# Patient Record
Sex: MALE | Race: WHITE | NOT HISPANIC OR LATINO | Employment: FULL TIME | URBAN - METROPOLITAN AREA
[De-identification: names, ages, dates, MRNs, and addresses within clinical notes are randomized per-mention and may not be internally consistent; named-entity substitution may affect disease eponyms.]

---

## 2017-05-09 ENCOUNTER — OFFICE VISIT - HEALTHEAST (OUTPATIENT)
Dept: FAMILY MEDICINE | Facility: CLINIC | Age: 37
End: 2017-05-09

## 2017-05-09 DIAGNOSIS — Z78.9 ENGAGES IN TRAVEL ABROAD: ICD-10-CM

## 2017-05-09 DIAGNOSIS — Z00.00 HEALTH CARE MAINTENANCE: ICD-10-CM

## 2017-05-09 LAB
CHOLEST SERPL-MCNC: 202 MG/DL
FASTING STATUS PATIENT QL REPORTED: YES
HDLC SERPL-MCNC: 47 MG/DL
LDLC SERPL CALC-MCNC: 135 MG/DL
TRIGL SERPL-MCNC: 98 MG/DL

## 2017-05-09 ASSESSMENT — MIFFLIN-ST. JEOR: SCORE: 1720.85

## 2017-05-16 ENCOUNTER — COMMUNICATION - HEALTHEAST (OUTPATIENT)
Dept: FAMILY MEDICINE | Facility: CLINIC | Age: 37
End: 2017-05-16

## 2018-08-03 ENCOUNTER — OFFICE VISIT - HEALTHEAST (OUTPATIENT)
Dept: FAMILY MEDICINE | Facility: CLINIC | Age: 38
End: 2018-08-03

## 2018-08-03 DIAGNOSIS — D22.9 NUMEROUS MOLES: ICD-10-CM

## 2018-08-03 DIAGNOSIS — Z00.00 HEALTH CARE MAINTENANCE: ICD-10-CM

## 2018-08-03 DIAGNOSIS — Z80.0 FAMILY HISTORY OF COLON CANCER: ICD-10-CM

## 2018-08-03 LAB
ALBUMIN SERPL-MCNC: 4.5 G/DL (ref 3.5–5)
ALP SERPL-CCNC: 44 U/L (ref 45–120)
ALT SERPL W P-5'-P-CCNC: 22 U/L (ref 0–45)
ANION GAP SERPL CALCULATED.3IONS-SCNC: 8 MMOL/L (ref 5–18)
AST SERPL W P-5'-P-CCNC: 24 U/L (ref 0–40)
BILIRUB SERPL-MCNC: 1.9 MG/DL (ref 0–1)
BUN SERPL-MCNC: 17 MG/DL (ref 8–22)
CALCIUM SERPL-MCNC: 9.8 MG/DL (ref 8.5–10.5)
CHLORIDE BLD-SCNC: 104 MMOL/L (ref 98–107)
CHOLEST SERPL-MCNC: 188 MG/DL
CO2 SERPL-SCNC: 27 MMOL/L (ref 22–31)
CREAT SERPL-MCNC: 0.93 MG/DL (ref 0.7–1.3)
FASTING STATUS PATIENT QL REPORTED: YES
GFR SERPL CREATININE-BSD FRML MDRD: >60 ML/MIN/1.73M2
GLUCOSE BLD-MCNC: 86 MG/DL (ref 70–125)
HDLC SERPL-MCNC: 46 MG/DL
LDLC SERPL CALC-MCNC: 127 MG/DL
POTASSIUM BLD-SCNC: 4.8 MMOL/L (ref 3.5–5)
PROT SERPL-MCNC: 7.2 G/DL (ref 6–8)
SODIUM SERPL-SCNC: 139 MMOL/L (ref 136–145)
TRIGL SERPL-MCNC: 77 MG/DL

## 2018-08-03 ASSESSMENT — MIFFLIN-ST. JEOR: SCORE: 1744.49

## 2018-08-06 ENCOUNTER — COMMUNICATION - HEALTHEAST (OUTPATIENT)
Dept: FAMILY MEDICINE | Facility: CLINIC | Age: 38
End: 2018-08-06

## 2018-08-06 LAB — 25(OH)D3 SERPL-MCNC: 30.7 NG/ML (ref 30–80)

## 2018-10-04 ENCOUNTER — RECORDS - HEALTHEAST (OUTPATIENT)
Dept: ADMINISTRATIVE | Facility: OTHER | Age: 38
End: 2018-10-04

## 2018-10-23 ENCOUNTER — RECORDS - HEALTHEAST (OUTPATIENT)
Dept: ADMINISTRATIVE | Facility: OTHER | Age: 38
End: 2018-10-23

## 2018-11-05 ENCOUNTER — RECORDS - HEALTHEAST (OUTPATIENT)
Dept: ADMINISTRATIVE | Facility: OTHER | Age: 38
End: 2018-11-05

## 2018-12-19 ENCOUNTER — OFFICE VISIT - HEALTHEAST (OUTPATIENT)
Dept: FAMILY MEDICINE | Facility: CLINIC | Age: 38
End: 2018-12-19

## 2018-12-19 DIAGNOSIS — J40 BRONCHITIS: ICD-10-CM

## 2019-01-17 ENCOUNTER — RECORDS - HEALTHEAST (OUTPATIENT)
Dept: ADMINISTRATIVE | Facility: OTHER | Age: 39
End: 2019-01-17

## 2019-01-23 ENCOUNTER — OFFICE VISIT - HEALTHEAST (OUTPATIENT)
Dept: FAMILY MEDICINE | Facility: CLINIC | Age: 39
End: 2019-01-23

## 2019-01-23 DIAGNOSIS — R05.3 PERSISTENT COUGH FOR 3 WEEKS OR LONGER: ICD-10-CM

## 2019-01-23 DIAGNOSIS — M54.50 ACUTE BILATERAL LOW BACK PAIN WITHOUT SCIATICA: ICD-10-CM

## 2019-01-23 RX ORDER — TIZANIDINE 2 MG/1
4 TABLET ORAL EVERY 6 HOURS PRN
Qty: 30 TABLET | Refills: 0 | Status: SHIPPED | OUTPATIENT
Start: 2019-01-23 | End: 2021-07-29

## 2019-01-25 ENCOUNTER — COMMUNICATION - HEALTHEAST (OUTPATIENT)
Dept: FAMILY MEDICINE | Facility: CLINIC | Age: 39
End: 2019-01-25

## 2020-07-06 ENCOUNTER — OFFICE VISIT - HEALTHEAST (OUTPATIENT)
Dept: FAMILY MEDICINE | Facility: CLINIC | Age: 40
End: 2020-07-06

## 2020-07-06 DIAGNOSIS — A69.20 EARLY LOCALIZED LYME DISEASE: ICD-10-CM

## 2021-05-30 VITALS — WEIGHT: 177.19 LBS | HEIGHT: 71 IN | BODY MASS INDEX: 24.81 KG/M2

## 2021-06-01 VITALS — HEIGHT: 71 IN | BODY MASS INDEX: 25.54 KG/M2 | WEIGHT: 182.4 LBS

## 2021-06-02 VITALS — WEIGHT: 179.38 LBS | BODY MASS INDEX: 25.02 KG/M2

## 2021-06-02 VITALS — WEIGHT: 181.4 LBS | BODY MASS INDEX: 25.3 KG/M2

## 2021-06-04 VITALS
TEMPERATURE: 98.6 F | DIASTOLIC BLOOD PRESSURE: 85 MMHG | OXYGEN SATURATION: 98 % | HEART RATE: 57 BPM | SYSTOLIC BLOOD PRESSURE: 136 MMHG | RESPIRATION RATE: 16 BRPM | BODY MASS INDEX: 25.17 KG/M2 | WEIGHT: 180.5 LBS

## 2021-06-09 NOTE — PROGRESS NOTES
Walk In Care Note                                                        Date of Visit: 7/6/2020     Chief Complaint   Titi Cabrera is a(n) 40 y.o. White or  male who presents to [facility] with the following complaint(s):  Rash (x2wks, on L chest, poss Lyme disease)       Assessment and Plan   1. Early localized Lyme disease  - doxycycline (MONODOX) 100 MG capsule; Take 1 capsule (100 mg total) by mouth 2 (two) times a day for 21 days.  Dispense: 42 capsule; Refill: 0      Patient presenting with rash consistent with early localized Lyme Disease. Empirically treating with doxycycline as listed above. Instructed patient to use adequate sun protection while taking this antibiotic. Discussed indications for reevaluation.     Counseled patient regarding assessment and plan for evaluation and treatment. Questions were answered. See AVS for the specific written instructions and educational handout(s) regarding Lyme Disease that were provided at the conclusion of the visit.     Discussed signs / symptoms that warrant urgent / emergent medical attention.     Follow up within 1 week if symptoms persist or worsen.      History of Present Illness   Primary symptom: Rash  Onset: 2 weeks ago  Location: Left pectoral area  Appearance: Small red patch initially, which is increasing in size and changing in appearance.   Progression: Gradually enlarging.   Pruritic: No  Painful: No  Discharge: No  Bleeding: No  Fevers: No  Chills: No  Associated sore throat: No  Preceding flu-like symptoms: None  Additional symptoms: None. Denies headaches, neck pain / stiffness, joint aches / swelling, and other rashes.   Home therapies utilized: None  History of similar rash: Yes, approximately 12 years ago, which was treated as Lyme Disease.   Contacts with similar rash: No  Known environmental exposure: No  New medication use: No  New detergent / fabric softener exposure: No  New personal hygiene product exposure: No  Pet / animal  exposure: No  Known tick bite: Has found deer ticks on himself in recent weeks but none of these were attached. Spends a significant amount of time outdoors.      Review of Systems   Review of Systems   All other systems reviewed and are negative.       Physical Exam   Vitals:    07/06/20 1546   BP: 136/85   Patient Site: Right Arm   Patient Position: Sitting   Cuff Size: Adult Regular   Pulse: (!) 57   Resp: 16   Temp: 98.6  F (37  C)   TempSrc: Oral   SpO2: 98%   Weight: 180 lb 8 oz (81.9 kg)     Physical Exam  Vitals signs and nursing note reviewed.   Constitutional:       General: He is not in acute distress.     Appearance: He is well-developed and normal weight. He is not ill-appearing or toxic-appearing.   Cardiovascular:      Rate and Rhythm: Normal rate and regular rhythm.      Heart sounds: S1 normal and S2 normal. No murmur. No friction rub. No gallop.    Pulmonary:      Effort: Pulmonary effort is normal.      Breath sounds: Normal breath sounds. No stridor. No wheezing, rhonchi or rales.   Skin:     General: Skin is warm and dry.      Coloration: Skin is not pale.      Findings: Erythema (4 cm wide x 3.5 cm tall patch of erythema on the left pectoral area without associated induration, fluctuance, or lesion) present. No rash.   Neurological:      General: No focal deficit present.      Mental Status: He is alert and oriented to person, place, and time.           Diagnostic Studies   Laboratory:  N/A  Radiology:  N/A  Electrocardiogram:  N/A     Procedure Note   N/A     Pertinent History   The following portions of the patient's history were reviewed and updated as appropriate: allergies, current medications, past family history, past medical history, past social history, past surgical history and problem list.    Patient has FH: colon cancer in first degree relative <60 years old on their problem list.    Patient has no past medical history on file.    Patient has a past surgical history that includes  pr removal of sperm duct(s) and Hernia repair.    Patient's family history includes Breast cancer in his mother; Rectal cancer in his mother; Skin cancer in his mother.    Patient reports that he has never smoked. He has never used smokeless tobacco. He reports current alcohol use. He reports that he does not use drugs.     Portions of this note have been dictated using voice recognition software. Any grammatical or contextual distortions are unintentional and inherent to the software.    Rusty Nino MD  [facility]

## 2021-06-10 NOTE — PROGRESS NOTES
MALE ADULT PREVENTIVE EXAM    CHIEF COMPLAINT:  Male preventive exam.    HISTORY OF PRESENT ILLNESS:  Titi Cabrera is a 37 y.o. male.  He  Is a new patient to me. Here today for physical and referral to travel clinic.  Wife is from [place] and they take periodic trips there a couple of times a year.  He plans on going there in June.  His hepatitis and B are up-to-date he has also had yellow fever vaccine about 11 years ago.  He is not sure whether he can get his travel needs taking care of here or if he needs referral to travel clinic.  He is not interested in updating his yellow fever posterior as it has been greater than 10 years and he would rather have this updated than not have it.  Other than travel needs he is feeling quite well without any complaints or concerns today.  He takes no medications besides a daily multivitamin.  He exercises 6-7 days a week and eats a healthy diet.  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    He  has no past medical history on file.    Lab Results   Component Value Date    WBC 5.0 05/09/2017    HGB 16.2 05/09/2017    HCT 48.2 05/09/2017    MCV 95 05/09/2017     05/09/2017     07/20/2016    K 3.8 07/20/2016    BUN 18 07/20/2016     Lab Results   Component Value Date    CHOL 196 03/27/2013    HDL 42 03/27/2013     No results found for: PSA  No results found for: TSH  BP Readings from Last 3 Encounters:   05/09/17 112/72   07/20/16 (!) 148/91       Surgeries:    Past Surgical History:   Procedure Laterality Date     HERNIA REPAIR       NE REMOVAL OF SPERM DUCT(S)      Description: Surgery Of Male Genitalia Vasectomy;  Recorded: 03/27/2013;  Comments: December, 2012--[facility]       Family History:  His family history includes Breast cancer in his mother; Rectal cancer in his mother; Skin cancer in his mother.    Social History:  He  reports that he has never smoked. He does not have any smokeless tobacco history on file. He reports that he drinks  alcohol. He reports that he does not use illicit drugs.    Medications:    Current Outpatient Prescriptions:      MULTIVITAMIN ORAL, Take by mouth., Disp: , Rfl:   HELD MEDICATIONS: None.    Allergies:  No latex allergies.  Allergies   Allergen Reactions     Other Environmental Allergy        RISK BEHAVIORS AND HEALTH HABITS:  PSA: The natural history of prostate cancer and ongoing controversy regarding screening and potential treatment outcomes of prostate cancer has been discussed with the patient. The meaning of a false positive PSA and a false negative PSA has been discussed. He indicates understanding of the limitations of this screening test.  Seat Belt Use: YES  Calcium intake/Osteoporosis prevention: YES  Guns: NO  Sun Screen: YES  Dental Care: YES    REVIEW OF SYSTEMS:  Complete head to toe review of systems is otherwise negative except as above.    OBJECTIVE:  VITAL SIGNS:    Vitals:    05/09/17 0922   BP: 112/72   Pulse: 74   Resp: 14   Temp: 98.2  F (36.8  C)     GENERAL:  Patient alert, in NAD  EYES: PERRLA. Extraoccular movements intact, pupils equal, reactive to light and accommodation.  Normal conjunctiva and lids.  Undilated fudoscopic exam normal, including normal size, appearance cup-to-disc ratio.  Normal posterior segments, including no exudates or hemorrhages.  ENT:  Hearing grossly normal.  Normal appearance to ears and nose.  Bilateral TM s, external canals, oropharynx normal. Normal lips, gums and teeth.  Normal nasal mucosa, septum and turbinates.  NECK:  Supple, without thyromegaly or mass.  RESP:  Clear to auscultation without crackles, wheezes or distress.  Normal respiratory effort.   BREASTS:  Nontender, without masses, nipple discharge, erythema, or axillary adenopathy.  CV:  Regular rate and rhythm without murmurs, rubs or gallops.  Normal carotid, abdominal aorta, femoral and pedal pulses.  No varicosities or edema.  ABDOMEN:  Soft, non-tender, without hepatosplenomegaly, masses, or  hernias.  :  Normal scrotum.  Penis circumcised without lesions or discharge.  LYMPHATIC: Normal palpation of neck, groin and axilla..  No lymphadenopathy.  No bruising.  NEURO:  CN II-XII intact, motor & sensory function all intact.  DTR and reflexes normal.  PSYCHIATRIC:  Alert & oriented with normal mood and affect.  Good judgment and insight.  SKIN:  Normal inspection and palpation.  MUSCULOSKELETAL: Normal gait and station.  - Spine / Ribs / Pelvis: Normal inspection, ROM, stability and strength: Spine, Head, Neck, Upper and Lower Extremities.    ASSESSMENT & PLAN  Titi was seen today for annual exam and referral.    Diagnoses and all orders for this visit:    Health care maintenance  -     Lipid [place] FASTING  -     Basic Metabolic Panel  -     HM2(CBC w/o Differential)    Engages in travel abroad  -     Ambulatory referral to Travel Clinic     Will update fasting lab work today and call when results are available.  Due to need for yellow fever vaccine patient referred to travel clinic.  Discussed with him that he will probably need typhoid and malaria prophylaxis as well.    There are no Patient Instructions on file for this visit.    General  Immunizations reviewed and updated .  Alcohol use, safety and moderation discussed.  Recommended adequate calcium intake/osteoporosis prevention.  Discussed colon cancer screening at age 50, 45 if -American.  Diet and exercise reviewed, including goal of aerobic exercise 30-90 minutes most days of the week, moderation of portions sizes, avoiding eating out and fast food and increase in fruits and vegetables.  Discussed safe sex practices.  Discussed & recommended seat belt (& motorcycle helmet) use.  Discussed & recommended smoke detector.  Discussed sun protection.  Discussed weight management.

## 2021-06-16 PROBLEM — Z80.0 FH: COLON CANCER IN FIRST DEGREE RELATIVE <60 YEARS OLD: Status: ACTIVE | Noted: 2018-11-08

## 2021-06-18 NOTE — PATIENT INSTRUCTIONS - HE
Patient Instructions by Rusty Nino MD at 7/6/2020  3:30 PM     Author: Rusty Nino MD Service: -- Author Type: Physician    Filed: 7/6/2020  4:34 PM Encounter Date: 7/6/2020 Status: Addendum    : Rusty Nino MD (Physician)    Related Notes: Original Note by Rusty Nino MD (Physician) filed at 7/6/2020  4:34 PM       -Take doxycycline as directed to empirically treat for Lyme Disease.  -Return if rash persists / worsens or other concerning symptoms develop.  Patient Education     Lyme Disease  Lyme disease is caused by bacteria. The infection is most often passed during the bite of a deer tick. The tick is very small, so many people with Lyme disease do not know they have been bitten. Tests for Lyme disease are not always accurate early in the disease. If the disease is suspected, treatment may begin before testing confirms the infection. A long course of antibiotics is the standard treatment.  If untreated, Lyme disease can worsen and full-body symptoms can develop          Early local symptoms may appear within a few days to a month after the tick bite. These symptoms may include a round, red rash that looks like a bull's-eye target with darker outer ring and a darker center. There may fever, chills, fatigue, body aches, and headache. In time, the rash goes away, even without treatment. That doesn't mean the infection has gone away, however. In some cases, early local symptoms never develop.    Early disseminated symptoms may appear weeks to months after the bite. These can include muscle aches, fatigue, fever, headache, stiff neck, and joint pain and swelling.    Late-stage symptoms include weakness in an arm, leg or one side of the face, headache, fever, and numbness and tingling in the arms or legs, confusion, and memory loss.  Testing is done for the presence of the bacteria. When the infection is treated early, it can be cured. In some cases, a second or third course of  antibiotics may be needed. Be sure to follow your healthcare providers directions about treatment.  Home care  If oral antibiotics have been prescribed, take them exactly as directed until they are completely gone. Do not stop taking them until you have taken the full course or your healthcare provider has told you to stop.  Ask your healthcare provider about taking over-the-counter medicines to control symptoms such as aches and fever.  Follow-up care  Follow up with your healthcare provider as advised. Be sure to return for follow-up testing as directed to be sure the infection has been treated.  When to seek medical advice  Call your healthcare provider right away if any of the following occur:    Current symptoms get worse    Unexplained fever, neck pain or stiffness, or headache    Arm, leg or facial weakness    Joint pain or swelling    Numbness and tingling in the arms or legs    Confusion or memory loss    Irregular or rapid heartbeat  Date Last Reviewed: 9/25/2015 2000-2017 [employer]. [address] [place] [address], [place] [address]. All rights reserved. This information is not intended as a substitute for professional medical care. Always follow your healthcare professional's instructions.

## 2021-06-19 NOTE — PROGRESS NOTES
Assessment:      Healthy male exam.    Encounter Diagnoses   Name Primary?     Health care maintenance Yes     Family history of colon cancer      Numerous moles          Plan:       All questions answered.    Patient Instructions   Tdap due Nov 2019    Colonoscopy now due to mother's early h/o colo-rectal cancer    Fasting lipid panel    Dermatology referral        Subjective:      Titi Cabrera is a 38 y.o. male who presents for an annual exam. The patient reports that there is not domestic violence in his life.     Healthy Habits:   Regular Exercise: Yes  Sunscreen Use: Yes  Healthy Diet: Yes  Dental Visits Regularly: Yes  Seat Belt: Yes  Sexually active: Yes  Monthly Self Testicular Exams:  Yes  Hemoccults: N/A  Flex Sig: N/A  Colonoscopy: we will schedule at this time due to family history  Lipid Profile: Yes  Glucose Screen: Yes  Prevention of Osteoporosis: Yes  Last Dexa: N/A  Guns at Home:  Yes  Guns Safety Locks:  Yes      Immunization History   Administered Date(s) Administered     Tdap 11/23/2009     Immunization status: up to date and documented.    No exam data present    Current Outpatient Prescriptions   Medication Sig Dispense Refill     MULTIVITAMIN ORAL Take by mouth.       No current facility-administered medications for this visit.      No past medical history on file.  Past Surgical History:   Procedure Laterality Date     HERNIA REPAIR       DE REMOVAL OF SPERM DUCT(S)      Description: Surgery Of Male Genitalia Vasectomy;  Recorded: 03/27/2013;  Comments: December, 2012--[facility]     Other environmental allergy  Family History   Problem Relation Age of Onset     Skin cancer Mother      Breast cancer Mother      Rectal cancer Mother      Social History     Social History     Marital status:      Spouse name: N/A     Number of children: N/A     Years of education: N/A     Occupational History     Professor at the [employer]      Social History Main Topics     Smoking  "status: Never Smoker     Smokeless tobacco: Never Used     Alcohol use Yes      Comment: Occasional      Drug use: No     Sexual activity: Not on file     Other Topics Concern     Not on file     Social History Narrative    7/20/16: The patient is an  at the [employer].       Review of Systems  General:  Denies problem  Eyes: Denies problem  Ears/Nose/Throat: Denies problem  Cardiovascular: Denies problem  Respiratory:  Denies problem  Gastrointestinal:  Denies problem  Genitourinary: Denies problem  Musculoskeletal:  Denies problem  Skin: Denies problem  Neurologic: Denies problem  Psychiatric: Denies problem  Endocrine: Denies problem  Heme/Lymphatic: Denies problem   Allergic/Immunologic: Denies problem        Objective:     Vitals:    08/03/18 0834   BP: 115/80   Pulse: (!) 59   Resp: 16   Temp: 97  F (36.1  C)   TempSrc: Oral   Weight: 182 lb 6.4 oz (82.7 kg)   Height: 5' 11\" (1.803 m)     Body mass index is 25.44 kg/(m^2).    Physical  General Appearance: Alert, cooperative, no distress, appears stated age  Head: Normocephalic, without obvious abnormality, atraumatic  Eyes: PERRL, conjunctiva/corneas clear, EOM's intact  Ears: Normal TM's and external ear canals, both ears  Nose: Nares normal, septum midline,mucosa normal, no drainage  Throat: Lips, mucosa, and tongue normal; teeth and gums normal  Neck: Supple, symmetrical, trachea midline, no adenopathy;  thyroid: not enlarged, symmetric, no tenderness/mass/nodules; no carotid bruit or JVD  Back: Symmetric, no curvature, ROM normal, no CVA tenderness  Lungs: Clear to auscultation bilaterally, respirations unlabored  Heart: Regular rate and rhythm, S1 and S2 normal, no murmur, rub, or gallop,  Abdomen: Soft, non-tender, bowel sounds active all four quadrants,  no masses, no organomegaly  Genitourinary: Penis normal. Right testis is descended. Left testis is descended.   PROSTATE SMOOTH SYMMETRIC WITHOUT NODULARITY, TX, OR " FIRMNESS  Musculoskeletal: Normal range of motion. No joint swelling or deformity.   Extremities: Extremities normal, atraumatic, no cyanosis or edema  Skin: multiple moles  Lymph nodes: Cervical, supraclavicular, and axillary nodes normal  Neurologic: He is alert. He has normal reflexes.   Psychiatric: He has a normal mood and affect.

## 2021-06-22 NOTE — PROGRESS NOTES
ASSESSMENT:  1. Bronchitis         PLAN:  Viral nature of symptoms discussed, declined inhaler or cough remedy today. Monitor symptoms and return as needed.     Use Mucinex 12 hour twice daily and loosen mucus. Increase water intake.    Saline spray in both nostrils for moisture and to thin mucus.    Use a humidifier at home to moisten the air.    No problem-specific Assessment & Plan notes found for this encounter.      There are no Patient Instructions on file for this visit.    No orders of the defined types were placed in this encounter.    There are no discontinued medications.    No Follow-up on file.    CHIEF COMPLAINT:  Chief Complaint   Patient presents with     chest congestion     x 1 month       HISTORY OF PRESENT ILLNESS:  Titi is a 38 y.o. male here today for evaluation for chest cold x 1 month. He generally feels ok, but cough is lasting a long time. It is non-productive, a bit of wheezing here and there. Overall does not feel feverish, chilled or fatigued. No shortness of breath. He did have a cold at onset of illness but this is gone now, just a bit of congestion remains. Wanted to come in for evaluation given long time frame to make sure intervention isn't needed. No OTC remedies used right now.     REVIEW OF SYSTEMS:        All other systems are negative  PFSH:  Reviewed, no changes      TOBACCO USE:  Social History     Tobacco Use   Smoking Status Never Smoker   Smokeless Tobacco Never Used       VITALS:  Vitals:    12/19/18 1318   BP: 126/82   Patient Site: Left Arm   Patient Position: Sitting   Cuff Size: Adult Large   Pulse: 67   Resp: 18   Temp: 97.7  F (36.5  C)   TempSrc: Oral   SpO2: 96%   Weight: 179 lb 6 oz (81.4 kg)     Wt Readings from Last 3 Encounters:   12/19/18 179 lb 6 oz (81.4 kg)   08/03/18 182 lb 6.4 oz (82.7 kg)   05/09/17 177 lb 3 oz (80.4 kg)       PHYSICAL EXAM:   /82 (Patient Site: Left Arm, Patient Position: Sitting, Cuff Size: Adult Large)   Pulse 67   Temp  97.7  F (36.5  C) (Oral)   Resp 18   Wt 179 lb 6 oz (81.4 kg)   SpO2 96%   BMI 25.02 kg/m    General appearance: alert, appears stated age and cooperative  Head: Normocephalic, without obvious abnormality, atraumatic  Eyes: conjunctivae/corneas clear. PERRL, EOM's intact. Fundi benign.  Ears: normal TM's and external ear canals both ears  Nose: Nares normal. Septum midline. Mucosa normal. No drainage or sinus tenderness.  Throat: lips, mucosa, and tongue normal; teeth and gums normal  Neck: mild anterior cervical adenopathy, no adenopathy, no carotid bruit, no JVD, supple, symmetrical, trachea midline and thyroid not enlarged, symmetric, no tenderness/mass/nodules  Lungs: clear to auscultation bilaterally  Heart: regular rate and rhythm, S1, S2 normal, no murmur, click, rub or gallop    DATA REVIEWED:  Additional History from Old Records Summarized (2): 0  Decision to Obtain Records (1): 0  Radiology Tests Summarized or Ordered (1): 0  Labs Reviewed or Ordered (1): 0  Medicine Test Summarized or Ordered (1): 0  Independent Review of EKG or X-RAY(2 each): 0    The visit lasted a total of 20 minutes face to face with the patient. Over 50% of the time was spent counseling and educating the patient about plan of care.    MEDICATIONS:  Current Outpatient Medications   Medication Sig Dispense Refill     MULTIVITAMIN ORAL Take by mouth.       No current facility-administered medications for this visit.        This note has been dictated using voice recognition software. Any grammatical or context distortions are unintentional and inherent to the software

## 2021-06-23 NOTE — TELEPHONE ENCOUNTER
Patient Returning Call  Reason for call:  Patient   Information relayed to patient:  Relayed message below   Patient has additional questions:  No  If YES, what are your questions/concerns:    Okay to leave a detailed message?: No call back needed

## 2021-06-23 NOTE — TELEPHONE ENCOUNTER
----- Message from Megan Sawant CNP sent at 1/24/2019  9:01 AM CST -----  Call patient and let him know radiologist report shows lungs are clear.

## 2021-07-29 ENCOUNTER — OFFICE VISIT (OUTPATIENT)
Dept: FAMILY MEDICINE | Facility: CLINIC | Age: 41
End: 2021-07-29
Payer: COMMERCIAL

## 2021-07-29 VITALS — HEIGHT: 71 IN | BODY MASS INDEX: 25.76 KG/M2 | RESPIRATION RATE: 16 BRPM | WEIGHT: 184 LBS

## 2021-07-29 DIAGNOSIS — Z00.00 ROUTINE GENERAL MEDICAL EXAMINATION AT A HEALTH CARE FACILITY: Primary | ICD-10-CM

## 2021-07-29 DIAGNOSIS — Z80.0 FH: COLON CANCER IN FIRST DEGREE RELATIVE <60 YEARS OLD: ICD-10-CM

## 2021-07-29 DIAGNOSIS — Z84.81 FH: BRCA1 GENE POSITIVE: ICD-10-CM

## 2021-07-29 LAB
ALBUMIN SERPL-MCNC: 4.4 G/DL (ref 3.5–5)
ALP SERPL-CCNC: 47 U/L (ref 45–120)
ALT SERPL W P-5'-P-CCNC: 21 U/L (ref 0–45)
ANION GAP SERPL CALCULATED.3IONS-SCNC: 8 MMOL/L (ref 5–18)
AST SERPL W P-5'-P-CCNC: 23 U/L (ref 0–40)
BILIRUB SERPL-MCNC: 2.1 MG/DL (ref 0–1)
BUN SERPL-MCNC: 20 MG/DL (ref 8–22)
CALCIUM SERPL-MCNC: 9.6 MG/DL (ref 8.5–10.5)
CHLORIDE BLD-SCNC: 102 MMOL/L (ref 98–107)
CHOLEST SERPL-MCNC: 195 MG/DL
CO2 SERPL-SCNC: 27 MMOL/L (ref 22–31)
CREAT SERPL-MCNC: 1.02 MG/DL (ref 0.7–1.3)
ERYTHROCYTE [DISTWIDTH] IN BLOOD BY AUTOMATED COUNT: 12 % (ref 10–15)
FASTING STATUS PATIENT QL REPORTED: YES
GFR SERPL CREATININE-BSD FRML MDRD: 90 ML/MIN/1.73M2
GLUCOSE BLD-MCNC: 86 MG/DL (ref 70–125)
HCT VFR BLD AUTO: 44.1 % (ref 40–53)
HDLC SERPL-MCNC: 43 MG/DL
HGB BLD-MCNC: 15.2 G/DL (ref 13.3–17.7)
LDLC SERPL CALC-MCNC: 129 MG/DL
MCH RBC QN AUTO: 32 PG (ref 26.5–33)
MCHC RBC AUTO-ENTMCNC: 34.5 G/DL (ref 31.5–36.5)
MCV RBC AUTO: 93 FL (ref 78–100)
PLATELET # BLD AUTO: 178 10E3/UL (ref 150–450)
POTASSIUM BLD-SCNC: 4.2 MMOL/L (ref 3.5–5)
PROT SERPL-MCNC: 7 G/DL (ref 6–8)
RBC # BLD AUTO: 4.75 10E6/UL (ref 4.4–5.9)
SODIUM SERPL-SCNC: 137 MMOL/L (ref 136–145)
TRIGL SERPL-MCNC: 113 MG/DL
WBC # BLD AUTO: 4.1 10E3/UL (ref 4–11)

## 2021-07-29 PROCEDURE — 99396 PREV VISIT EST AGE 40-64: CPT | Mod: 25 | Performed by: FAMILY MEDICINE

## 2021-07-29 PROCEDURE — 80061 LIPID PANEL: CPT | Performed by: FAMILY MEDICINE

## 2021-07-29 PROCEDURE — 90715 TDAP VACCINE 7 YRS/> IM: CPT | Performed by: FAMILY MEDICINE

## 2021-07-29 PROCEDURE — 36415 COLL VENOUS BLD VENIPUNCTURE: CPT | Performed by: FAMILY MEDICINE

## 2021-07-29 PROCEDURE — 80053 COMPREHEN METABOLIC PANEL: CPT | Performed by: FAMILY MEDICINE

## 2021-07-29 PROCEDURE — 85027 COMPLETE CBC AUTOMATED: CPT | Performed by: FAMILY MEDICINE

## 2021-07-29 PROCEDURE — 90471 IMMUNIZATION ADMIN: CPT | Performed by: FAMILY MEDICINE

## 2021-07-29 ASSESSMENT — ENCOUNTER SYMPTOMS
MYALGIAS: 0
SHORTNESS OF BREATH: 0
DIZZINESS: 0
DYSURIA: 0
FREQUENCY: 0
CONSTIPATION: 0
ABDOMINAL PAIN: 0
PALPITATIONS: 0
CHILLS: 0
HEARTBURN: 0
JOINT SWELLING: 0
HEMATOCHEZIA: 0
NERVOUS/ANXIOUS: 0
NAUSEA: 0
PARESTHESIAS: 0
DIARRHEA: 0
EYE PAIN: 0
HEMATURIA: 0
COUGH: 0
WEAKNESS: 0
SORE THROAT: 0
ARTHRALGIAS: 0
HEADACHES: 0
FEVER: 0

## 2021-07-29 ASSESSMENT — MIFFLIN-ST. JEOR: SCORE: 1743.81

## 2021-07-29 NOTE — PROGRESS NOTES
SUBJECTIVE:   CC: Titi Cabrera is an 41 year old male who presents for preventative health visit.       Patient has been advised of split billing requirements and indicates understanding: Yes  Healthy Habits:     Getting at least 3 servings of Calcium per day:  Yes    Bi-annual eye exam:  Yes    Dental care twice a year:  Yes    Sleep apnea or symptoms of sleep apnea:  None    Diet:  Regular (no restrictions)    Frequency of exercise:  6-7 days/week    Duration of exercise:  30-45 minutes    Taking medications regularly:  Yes    Medication side effects:  None    PHQ-2 Total Score: 0    Additional concerns today:  No      Bareny presents for his annual exam.  He is new to me today's we reviewed his past medical history.  He has had a colonoscopy as his mother had colon cancer.  She also had skin cancer, cervical cancer, and breast cancer.  He states she was positive for the BRCA gene but he cannot remember if it was 1 or 2.  He has not had counseling regarding if he should be tested or not.  Discussed guidelines.  Offered referral to genetic counselor.  He will think about it.  Has 1 son and no daughters.  He had a grandfather that had prostate cancer in older years.  We discussed possibly starting prostate cancer screening earlier with a PSA.        Today's PHQ-2 Score:   PHQ-2 ( 1999 Pf[employer]) 7/29/2021   Q1: Little interest or pleasure in doing things 0   Q2: Feeling down, depressed or hopeless 0   PHQ-2 Score 0   Q1: Little interest or pleasure in doing things Not at all   Q2: Feeling down, depressed or hopeless Not at all   PHQ-2 Score 0       Abuse: Current or Past(Physical, Sexual or Emotional)- No  Do you feel safe in your environment? Yes    Have you ever done Advance Care Planning? (For example, a Health Directive, POLST, or a discussion with a medical provider or your loved ones about your wishes): No, advance care planning information given to patient to review.  Advanced care planning was discussed at  today's visit.    Social History     Tobacco Use     Smoking status: Never Smoker     Smokeless tobacco: Never Used   Substance Use Topics     Alcohol use: Yes     Comment: Alcoholic Drinks/day: Occasional          Alcohol Use 7/29/2021   Prescreen: >3 drinks/day or >7 drinks/week? No       Last PSA: No results found for: PSA    Reviewed orders with patient. Reviewed health maintenance and updated orders accordingly - Yes  Lab work is in process    Reviewed and updated as needed this visit by clinical staff  Tobacco  Allergies  Meds              Reviewed and updated as needed this visit by Provider                    Review of Systems   Constitutional: Negative for chills and fever.   HENT: Negative for congestion, ear pain, hearing loss and sore throat.    Eyes: Negative for pain and visual disturbance.   Respiratory: Negative for cough and shortness of breath.    Cardiovascular: Negative for chest pain, palpitations and peripheral edema.   Gastrointestinal: Negative for abdominal pain, constipation, diarrhea, heartburn, hematochezia and nausea.   Genitourinary: Negative for discharge, dysuria, frequency, genital sores, hematuria, impotence and urgency.   Musculoskeletal: Negative for arthralgias, joint swelling and myalgias.   Skin: Negative for rash.   Neurological: Negative for dizziness, weakness, headaches and paresthesias.   Psychiatric/Behavioral: Negative for mood changes. The patient is not nervous/anxious.      CONSTITUTIONAL: NEGATIVE for fever, chills, change in weight  INTEGUMENTARY/SKIN: NEGATIVE for worrisome rashes, moles or lesions  EYES: NEGATIVE for vision changes or irritation  ENT: NEGATIVE for ear, mouth and throat problems  RESP: NEGATIVE for significant cough or SOB  CV: NEGATIVE for chest pain, palpitations or peripheral edema  GI: NEGATIVE for nausea, abdominal pain, heartburn, or change in bowel habits   male: negative for dysuria, hematuria, decreased urinary stream, erectile  "dysfunction, urethral discharge  MUSCULOSKELETAL: NEGATIVE for significant arthralgias or myalgia  NEURO: NEGATIVE for weakness, dizziness or paresthesias  PSYCHIATRIC: NEGATIVE for changes in mood or affect    OBJECTIVE:   Resp 16   Ht 1.791 m (5' 10.5\")   Wt 83.5 kg (184 lb)   BMI 26.03 kg/m      Physical Exam  GENERAL: healthy, alert and no distress  EYES: Eyes grossly normal to inspection, PERRL and conjunctivae and sclerae normal  HENT: ear canals and TM's normal, nose and mouth without ulcers or lesions  NECK: no adenopathy, no asymmetry, masses, or scars and thyroid normal to palpation  RESP: lungs clear to auscultation - no rales, rhonchi or wheezes  CV: regular rate and rhythm, normal S1 S2, no S3 or S4, no murmur, click or rub, no peripheral edema and peripheral pulses strong  ABDOMEN: soft, nontender, no hepatosplenomegaly, no masses and bowel sounds normal  MS: no gross musculoskeletal defects noted, no edema  SKIN: no suspicious lesions or rashes, small soft mobile mass anterior chest wall inferior to left breast  NEURO: Normal strength and tone, mentation intact and speech normal  PSYCH: mentation appears normal, affect normal/bright    Diagnostic Test Results:  Labs reviewed in Epic  Results for orders placed or performed in visit on 07/29/21 (from the past 24 hour(s))   CBC with platelets   Result Value Ref Range    WBC Count 4.1 4.0 - 11.0 10e3/uL    RBC Count 4.75 4.40 - 5.90 10e6/uL    Hemoglobin 15.2 13.3 - 17.7 g/dL    Hematocrit 44.1 40.0 - 53.0 %    MCV 93 78 - 100 fL    MCH 32.0 26.5 - 33.0 pg    MCHC 34.5 31.5 - 36.5 g/dL    RDW 12.0 10.0 - 15.0 %    Platelet Count 178 150 - 450 10e3/uL       ASSESSMENT/PLAN:   1. Routine general medical examination at a health care facility  As far as healthcare maintenance, he had a colonoscopy in 2018 so will be due in 2023 due to family history of colon cancer.  We will do screening fasting blood work.  He is exercising regularly.  He has a benign " "appearing sebaceous cyst on his left anterior chest wall.  Discussed benign nature of findings, would not recommend removal unless it is bothersome.  - Comprehensive metabolic panel; Future  - CBC with platelets; Future  - Lipid panel reflex to direct LDL Fasting; Future  - Comprehensive metabolic panel  - CBC with platelets  - Lipid panel reflex to direct LDL Fasting    2. FH: colon cancer in first degree relative <60 years old      3. FH: BRCA1 gene positive  Discussed possible referral to genetic counselor for further discussion.  Discussed possibly doing PSA screening earlier.  He will think about it and get back to me if he wants to pursue either of these options.      Patient has been advised of split billing requirements and indicates understanding: Yes  COUNSELING:   Reviewed preventive health counseling, as reflected in patient instructions       Regular exercise       Healthy diet/nutrition       Colon cancer screening       Prostate cancer screening    Estimated body mass index is 26.03 kg/m  as calculated from the following:    Height as of this encounter: 1.791 m (5' 10.5\").    Weight as of this encounter: 83.5 kg (184 lb).     Weight management plan: Discussed healthy diet and exercise guidelines    He reports that he has never smoked. He has never used smokeless tobacco.      Counseling Resources:  ATP IV Guidelines  Pooled Cohorts Equation Calculator  FRAX Risk Assessment  ICSI Preventive Guidelines  Dietary Guidelines for Americans, 2010  USDA's MyPlate  ASA Prophylaxis  Lung CA Screening    Heather Salvador  [facility]  "

## 2021-09-26 ENCOUNTER — HEALTH MAINTENANCE LETTER (OUTPATIENT)
Age: 41
End: 2021-09-26

## 2022-12-22 ENCOUNTER — ANCILLARY PROCEDURE (OUTPATIENT)
Dept: GENERAL RADIOLOGY | Facility: CLINIC | Age: 42
End: 2022-12-22
Attending: FAMILY MEDICINE
Payer: COMMERCIAL

## 2022-12-22 ENCOUNTER — OFFICE VISIT (OUTPATIENT)
Dept: ORTHOPEDICS | Facility: CLINIC | Age: 42
End: 2022-12-22
Payer: COMMERCIAL

## 2022-12-22 VITALS — SYSTOLIC BLOOD PRESSURE: 112 MMHG | DIASTOLIC BLOOD PRESSURE: 74 MMHG

## 2022-12-22 DIAGNOSIS — G89.29 CHRONIC BILATERAL LOW BACK PAIN, UNSPECIFIED WHETHER SCIATICA PRESENT: Primary | ICD-10-CM

## 2022-12-22 DIAGNOSIS — M54.50 LOW BACK PAIN: ICD-10-CM

## 2022-12-22 DIAGNOSIS — M54.9 BACK PAIN: Primary | ICD-10-CM

## 2022-12-22 DIAGNOSIS — M54.50 CHRONIC BILATERAL LOW BACK PAIN, UNSPECIFIED WHETHER SCIATICA PRESENT: Primary | ICD-10-CM

## 2022-12-22 PROCEDURE — 72100 X-RAY EXAM L-S SPINE 2/3 VWS: CPT | Mod: TC | Performed by: RADIOLOGY

## 2022-12-22 PROCEDURE — 99204 OFFICE O/P NEW MOD 45 MIN: CPT | Performed by: FAMILY MEDICINE

## 2022-12-22 RX ORDER — NABUMETONE 500 MG/1
500 TABLET, FILM COATED ORAL 2 TIMES DAILY PRN
Qty: 30 TABLET | Refills: 0 | Status: SHIPPED | OUTPATIENT
Start: 2022-12-22 | End: 2023-01-31

## 2022-12-22 RX ORDER — CYCLOBENZAPRINE HCL 10 MG
10 TABLET ORAL
Qty: 30 TABLET | Refills: 0 | Status: SHIPPED | OUTPATIENT
Start: 2022-12-22 | End: 2023-01-31

## 2022-12-22 RX ORDER — METHYLPREDNISOLONE 4 MG
TABLET, DOSE PACK ORAL
Qty: 21 TABLET | Refills: 0 | Status: SHIPPED | OUTPATIENT
Start: 2022-12-22 | End: 2023-01-31

## 2022-12-22 NOTE — PATIENT INSTRUCTIONS
# Acute on Chronic Low Back Pain: Titi Cabrera  was seen today for acute flare of chronic back pain after running on treadmill. Pain in lumbar region bilaterally. Symptoms had been going on for 2 days, pain so severe EMS was called to home. On examination there are positive findings of pain with lumbar extension.  No red flag symptoms/signs on history or examination. Imaging findings showed no disc degeneration. Likely cause of patient's condition due to facet arthropathy flare. Other possible conditions contributing to symptoms include disc herniation. Pain limiting ability to function.  Counseled patient on nature of condition and treatment options.  Given this plan as below, follow-up as needed if pain not improved.     Image Findings: no lumbar degeneration  Treatment: Activities as tolerated, home exercises given today, spine referral placed  Job: As tolerated    Medications/Injections: Tylenol 1000 mg 3 times per day, Medrol dosepack ordered, Relafen start after steroid, Flexeril at night  Follow-up: In one month if symptoms do not improve, sooner if worsening  Can consider follow-up with Spine     Please call [phone]   Ask for my team if you have any questions or concerns    If you have not yet received the influenza vaccine but would like to get one, please call  [phone] or you can schedule via [employer]    It was great seeing you today!    Otto Tsang MD, [facility]

## 2022-12-22 NOTE — LETTER
12/22/2022         RE: Titi Cabrera  [address]  [facility] [address]        Dear Colleague,    Thank you for referring your patient, Titi Cabrera, to the [facility] [place]. Please see a copy of my visit note below.    ASSESSMENT & PLAN    Titi was seen today for pain.    Diagnoses and all orders for this visit:    Chronic bilateral low back pain, unspecified whether sciatica present  -     XR Lumbar Spine 2-3 Views*; Future  -     methylPREDNISolone (MEDROL DOSEPAK) 4 MG tablet therapy pack; Follow Package Directions  -     nabumetone (RELAFEN) 500 MG tablet; Take 1 tablet (500 mg) by mouth 2 times daily as needed for moderate pain (4-6)  -     cyclobenzaprine (FLEXERIL) 10 MG tablet; Take 1 tablet (10 mg) by mouth nightly as needed for muscle spasms  -     Spine  Referral; Future  -     Physical Therapy Referral; Future      This issue is acute on chronic and Worsening.    # Acute on Chronic Low Back Pain: Titi Cabrera  was seen today for acute flare of chronic back pain after running on treadmill. Pain in lumbar region bilaterally. Symptoms had been going on for 2 days, pain so severe EMS was called to home. On examination there are positive findings of pain with lumbar extension.  No red flag symptoms/signs on history or examination. Imaging findings showed no disc degeneration. Likely cause of patient's condition due to facet arthropathy flare. Other possible conditions contributing to symptoms include disc herniation. Pain limiting ability to function.  Counseled patient on nature of condition and treatment options.  Given this plan as below, follow-up as needed if pain not improved.     Image Findings: no lumbar degeneration  Treatment: Activities as tolerated, home exercises given today, spine referral placed  Job: As tolerated    Medications/Injections: Tylenol 1000 mg 3 times per day, Medrol dosepack ordered, Relafen start after steroid,  Flexeril at night  Follow-up: In one month if symptoms do not improve, sooner if worsening  Can consider follow-up with Spine       Otto Tsang MD  [facility] [place]    -----  Chief Complaint   Patient presents with     Lower Back - Pain       SUBJECTIVE  Titi Cabrera is a/an 42 year old male who is seen as a self referral for evaluation of low back pain.     The patient is seen with their wife.      Onset: 12/20/22, 2 day(s) ago. Patient describes injury as worsening pain during a jog on the treadmill. This morning, 12/22/22, patient was in severe pain with significant difficulty moving.  EMS was called to their house and his vitals were good.  Has had episodes over the past few years.  Location of Pain: bilateral low back pain   Worsened by: any movement   Better with: Aleve   Treatments tried: rest/activity avoidance  Associated symptoms: feeling light headed, dizziness and sweaty     Orthopedic/Surgical history: YES - similar symptoms 3-4 flares/ year   Social History/Occupation: Professor at [employer]     No family history pertinent to patient's problem today.       REVIEW OF SYSTEMS:  Review of Systems  Constitutional, HEENT, cardiovascular, pulmonary, GI, , musculoskeletal, neuro, skin, endocrine and psych systems are negative, except as otherwise noted.    OBJECTIVE:  /74    General: healthy, alert and in no distress  HEENT: no scleral icterus or conjunctival erythema  Skin: no suspicious lesions or rash. No jaundice.  CV: distal perfusion intact    Resp: normal respiratory effort without conversational dyspnea   Psych: normal mood and affect  Gait: normal steady gait with appropriate coordination and balance    Neuro: Normal light sensory exam of bilateral lower extremities    Ortho Exam   THORACIC/LUMBAR SPINE  Inspection:    No redness, swelling, overlying skin change, gross deformity/asymmetry, scapular winging  Palpation:  No tender to palpation  Range of  Motion:     Lumbar flexion limited slightly by pain    Lumbar extension limited substantially by pain    Right side bend limited slightly by pain    Left side bend limited slightly by pain    Right rotation limited slightly by pain    Left rotation limited slightly by pain  Strength:    5/5 - quadriceps, hamstrings, tibialis anterior, gastrocsoleus, and extensor hallicus longus  Special Tests:    Positive: None  Negative: straight leg raise (bilateral), slump test (bilateral)    BILATERAL HIP  Inspection:    No swelling, bruising, discoloration, or obvious deformity or asymmetry  Palpation:  Nontender.    Crepitus is Absent  Active Range of Motion:     Flexion full, extension full / IR full / ER  full  Strength:    Flexion 5/5 / extension 5/5 / adduction 5/5 / abduction 5/5  Special Tests:    Positive: None    Negative: ANDERSON VegaER, anterior impingement (FADIR)        RADIOLOGY:  I independently ordered, visualized and reviewed these images with the patient    No significant lumbar degeneration    IMPRESSION: No fracture is identified. Mild lower lumbar spine  degenerative change. Suspect mild bilateral sacroiliac joint  degenerative change, right worse than left.     CHARLES DUMONT MD             Review of external notes as documented elsewhere in note  Review of the result(s) of each unique test - lumbar x-rays      Disclaimer: This note consists of symbols derived from keyboarding, dictation and/or voice recognition software. As a result, there may be errors in the script that have gone undetected. Please consider this when interpreting information found in this chart.        Again, thank you for allowing me to participate in the care of your patient.        Sincerely,        Otto Tsang MD

## 2022-12-22 NOTE — PROGRESS NOTES
ASSESSMENT & PLAN    Titi was seen today for pain.    Diagnoses and all orders for this visit:    Chronic bilateral low back pain, unspecified whether sciatica present  -     XR Lumbar Spine 2-3 Views*; Future  -     methylPREDNISolone (MEDROL DOSEPAK) 4 MG tablet therapy pack; Follow Package Directions  -     nabumetone (RELAFEN) 500 MG tablet; Take 1 tablet (500 mg) by mouth 2 times daily as needed for moderate pain (4-6)  -     cyclobenzaprine (FLEXERIL) 10 MG tablet; Take 1 tablet (10 mg) by mouth nightly as needed for muscle spasms  -     Spine  Referral; Future  -     Physical Therapy Referral; Future      This issue is acute on chronic and Worsening.    # Acute on Chronic Low Back Pain: Titi Cabrera  was seen today for acute flare of chronic back pain after running on treadmill. Pain in lumbar region bilaterally. Symptoms had been going on for 2 days, pain so severe EMS was called to home. On examination there are positive findings of pain with lumbar extension.  No red flag symptoms/signs on history or examination. Imaging findings showed no disc degeneration. Likely cause of patient's condition due to facet arthropathy flare. Other possible conditions contributing to symptoms include disc herniation. Pain limiting ability to function.  Counseled patient on nature of condition and treatment options.  Given this plan as below, follow-up as needed if pain not improved.     Image Findings: no lumbar degeneration  Treatment: Activities as tolerated, home exercises given today, spine referral placed  Job: As tolerated    Medications/Injections: Tylenol 1000 mg 3 times per day, Medrol dosepack ordered, Relafen start after steroid, Flexeril at night  Follow-up: In one month if symptoms do not improve, sooner if worsening  Can consider follow-up with Spine       Otto Tsang MD  [facility] [place]    -----  Chief Complaint   Patient presents with     Lower  Back - Pain       SUBJECTIVE  Titi Cabrera is a/an 42 year old male who is seen as a self referral for evaluation of low back pain.     The patient is seen with their wife.      Onset: 12/20/22, 2 day(s) ago. Patient describes injury as worsening pain during a jog on the treadmill. This morning, 12/22/22, patient was in severe pain with significant difficulty moving.  EMS was called to their house and his vitals were good.  Has had episodes over the past few years.  Location of Pain: bilateral low back pain   Worsened by: any movement   Better with: Aleve   Treatments tried: rest/activity avoidance  Associated symptoms: feeling light headed, dizziness and sweaty     Orthopedic/Surgical history: YES - similar symptoms 3-4 flares/ year   Social History/Occupation: Professor at      No family history pertinent to patient's problem today.       REVIEW OF SYSTEMS:  Review of Systems  Constitutional, HEENT, cardiovascular, pulmonary, GI, , musculoskeletal, neuro, skin, endocrine and psych systems are negative, except as otherwise noted.    OBJECTIVE:  /74    General: healthy, alert and in no distress  HEENT: no scleral icterus or conjunctival erythema  Skin: no suspicious lesions or rash. No jaundice.  CV: distal perfusion intact    Resp: normal respiratory effort without conversational dyspnea   Psych: normal mood and affect  Gait: normal steady gait with appropriate coordination and balance    Neuro: Normal light sensory exam of bilateral lower extremities    Ortho Exam   THORACIC/LUMBAR SPINE  Inspection:    No redness, swelling, overlying skin change, gross deformity/asymmetry, scapular winging  Palpation:  No tender to palpation  Range of Motion:     Lumbar flexion limited slightly by pain    Lumbar extension limited substantially by pain    Right side bend limited slightly by pain    Left side bend limited slightly by pain    Right rotation limited slightly by pain    Left rotation limited slightly by  pain  Strength:    5/5 - quadriceps, hamstrings, tibialis anterior, gastrocsoleus, and extensor hallicus longus  Special Tests:    Positive: None  Negative: straight leg raise (bilateral), slump test (bilateral)    BILATERAL HIP  Inspection:    No swelling, bruising, discoloration, or obvious deformity or asymmetry  Palpation:  Nontender.    Crepitus is Absent  Active Range of Motion:     Flexion full, extension full / IR full / ER  full  Strength:    Flexion 5/5 / extension 5/5 / adduction 5/5 / abduction 5/5  Special Tests:    Positive: None    Negative: Logroll, RAMONA, anterior impingement (FADIR)        RADIOLOGY:  I independently ordered, visualized and reviewed these images with the patient    No significant lumbar degeneration    IMPRESSION: No fracture is identified. Mild lower lumbar spine  degenerative change. Suspect mild bilateral sacroiliac joint  degenerative change, right worse than left.     CHARLES DUMONT MD             Review of external notes as documented elsewhere in note  Review of the result(s) of each unique test - lumbar x-rays      Disclaimer: This note consists of symbols derived from keyboarding, dictation and/or voice recognition software. As a result, there may be errors in the script that have gone undetected. Please consider this when interpreting information found in this chart.

## 2023-01-14 ENCOUNTER — HEALTH MAINTENANCE LETTER (OUTPATIENT)
Age: 43
End: 2023-01-14

## 2023-01-30 NOTE — PROGRESS NOTES
ASSESSMENT: Titi Cabrera is a 42 year old male who is otherwise healthy who presents today for new patient evaluation of episodic bilateral low back pain without radicular symptoms.  Most recent flare began approximately December 20 after walking on a treadmill and has resolved completely with medical management including Medrol Dosepak, muscle relaxers, and nabumetone.  My review of an x-ray lumbar spine shows no fracture.  There is mild degenerative change lower lumbar spine and sacroiliac joints.  Patient is neurologically intact.  He does not have any red flags.    PLAN:  A shared decision making model was used.  The patient's values and choices were respected.  The following represents what was discussed and decided upon by the physician assistant and the patient.      1.  DIAGNOSTIC TESTS:    -I reviewed the x-ray lumbar spine.    -No additional diagnostic tests were ordered.  If pain were to flare back up, I would recommend obtaining an MRI lumbar spine.    2.  PHYSICAL THERAPY:  Discussed the importance of core strengthening, ROM, stretching exercises with the patient and how each of these entities is important in decreasing pain.  Explained to the patient that the purpose of physical therapy is to teach the patient a home exercise program.  These exercises need to be performed every day in order to decrease pain and prevent future occurrences of pain.  I entered a referral to physical therapy.  He will do the MedX program.    3.  MEDICATIONS: No changes are made to the patient's medications.  He is not currently taking any pain relief medications since pain has resolved.  -Patient completed a Medrol Dosepak which was helpful.  -Patient could take nabumetone if needed.  -Patient could take cyclobenzaprine if needed.    4.  INTERVENTIONS:  No interventions were ordered today.  Flare pain has resolved.    5.  PATIENT EDUCATION: Patient is in agreement the above plan.  All questions were answered.  -We  discussed trying to avoid heavy lifting, using good body mechanics, etc. in order to prevent future flares.    6.  FOLLOW-UP:   Patient is going to send a Snappy shuttlet message if he has another flare of pain or if he does not feel he is making progress with physical therapy.  If he has any other questions or concerns, he should not hesitate to call.      SUBJECTIVE:  Titi Cabrera  Is a 42 year old male who presents today in consultation at the request of Dr. Tsang for new patient evaluation of low back pain.  Patient reports that he has had episodic low back pain for the past 3 to 4 years.  He states he gets 1-2 flareups per year.  Most recent flare began December 20 after walking on the treadmill.  Pain was so severe that he had difficulty getting out of bed on his own.  When he did get out of bed he felt faint.  He ended up going to a sports medicine clinic.  They prescribed a Medrol Dosepak which was helpful.  They also prescribed Flexeril and nabumetone which she took as needed and were also helpful.  He states at this point the pain has completely resolved.    Patient reports that he currently does not have any pain.  When his pain was present it spanned across the entire low back bilaterally.  Both sides are equally affected.  He states the muscles felt very tight.  He did not have any pain down the legs.  He did not have any numbness, tingling, weakness down the legs.  When pain was severe he felt he could not engage his core and he had to lean on things in order to stand upright.  Laying down is the most comfortable position but any movement made his pain worse.  He did not have any loss of bowel or bladder control.  Denies recent fevers.    Patient has never had any treatments for this.  He has not had physical therapy.  He does not go to a chiropractor.  He has never had any spine surgeries or spine injections.  He does not take any pain relieving medications.    Current Outpatient Medications   Medication      cyclobenzaprine (FLEXERIL) 10 MG tablet     methylPREDNISolone (MEDROL DOSEPAK) 4 MG tablet therapy pack     MULTIVITAMIN ORAL     nabumetone (RELAFEN) 500 MG tablet         Allergies   Allergen Reactions     Other Environmental Allergy Unknown         Patient Active Problem List   Diagnosis     FH: colon cancer in first degree relative <60 years old     FH: BRCA1 gene positive       Past Surgical History:   Procedure Laterality Date     HERNIA REPAIR       REMOVAL OF SPERM DUCT(S)      Description: Surgery Of Male Genitalia Vasectomy;  Recorded: 03/27/2013;  Comments: December, 2012--[facility]       Family History   Problem Relation Age of Onset     Skin Cancer Mother      Breast Cancer Mother      Rectal Cancer Mother        Social history: Patient is .  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  He denies tobacco, alcohol, illicit drug use.      ROS: Positive for joint pain, muscle pain.  Specifically negative for bowel/bladder dysfunction, fevers,chills, appetite changes, unexplained weight loss.   Otherwise 13 systems reviewed are negative.  Please see the patient's intake questionnaire from today for details.      OBJECTIVE:  PHYSICAL EXAMINATION:    CONSTITUTIONAL:  Vital signs as above.  No acute distress.  The patient is well nourished and well groomed.  PSYCHIATRIC:  The patient is awake, alert, oriented to person, place, time and answering questions appropriately with clear speech.    HEENT: Normocephalic, atraumatic.  Sclera clear.  Neck is supple.  SKIN:  Skin over the face, bilateral lower extremities, and posterior torso is clean, dry, intact without rashes.    GAIT:  Gait is non-antalgic.  The patient is able to heel and toe walk without significant difficulty.    STANDING EXAMINATION: No tenderness palpation bilateral lumbar paraspinous muscles.  Lumbar range of motion is full with flexion, extension, lateral rotation bilaterally.  MUSCLE STRENGTH:  The patient  has 5/5 strength for the bilateral hip flexors, knee flexors/extensors, ankle dorsiflexors/plantar flexors, great toe extensors, ankle evertors/invertors.  NEUROLOGICAL: 2+ patellar, and achilles reflexes bilaterally.  Negative Babinski's bilaterally.  No ankle clonus bilaterally. Sensation to light touch is intact in the bilateral L4, L5, and S1 dermatomes.  VASCULAR:  2/4 dorsalis pedis pulses bilaterally.  Bilateral lower extremities are warm.  There is no pitting edema of the bilateral lower extremities.  ABDOMINAL:  Soft, non-distended, non-tender throughout all quadrants.  No pulsatile mass palpated in the left lower quadrant.  LYMPH NODES:  No palpable or tender inguinal lymph nodes.  MUSCULOSKELETAL: Straight leg raise is negative bilaterally.    RESULTS: I reviewed the x-ray lumbar spine from [place] dated December 22, 2022.  This shows no fracture.  There is mild lower lumbar spine degenerative change.  There is mild bilateral SI joint change, right more than left.

## 2023-01-31 ENCOUNTER — OFFICE VISIT (OUTPATIENT)
Dept: PHYSICAL MEDICINE AND REHAB | Facility: CLINIC | Age: 43
End: 2023-01-31
Attending: FAMILY MEDICINE
Payer: COMMERCIAL

## 2023-01-31 VITALS
HEIGHT: 71 IN | SYSTOLIC BLOOD PRESSURE: 138 MMHG | BODY MASS INDEX: 25.76 KG/M2 | WEIGHT: 184 LBS | HEART RATE: 77 BPM | DIASTOLIC BLOOD PRESSURE: 81 MMHG

## 2023-01-31 DIAGNOSIS — M54.50 LUMBAR SPINE PAIN: Primary | ICD-10-CM

## 2023-01-31 DIAGNOSIS — M54.50 CHRONIC BILATERAL LOW BACK PAIN, UNSPECIFIED WHETHER SCIATICA PRESENT: ICD-10-CM

## 2023-01-31 DIAGNOSIS — G89.29 CHRONIC BILATERAL LOW BACK PAIN, UNSPECIFIED WHETHER SCIATICA PRESENT: ICD-10-CM

## 2023-01-31 PROCEDURE — 99214 OFFICE O/P EST MOD 30 MIN: CPT | Performed by: PHYSICIAN ASSISTANT

## 2023-01-31 ASSESSMENT — PAIN SCALES - GENERAL: PAINLEVEL: NO PAIN (0)

## 2023-01-31 NOTE — LETTER
1/31/2023         RE: Titi Cabrera  [address]  [facility] [address]        Dear Colleague,    Thank you for referring your patient, Titi Cabrera, to the [facility]. Please see a copy of my visit note below.    ASSESSMENT: Titi Cabrera is a 42 year old male who is otherwise healthy who presents today for new patient evaluation of episodic bilateral low back pain without radicular symptoms.  Most recent flare began approximately December 20 after walking on a treadmill and has resolved completely with medical management including Medrol Dosepak, muscle relaxers, and nabumetone.  My review of an x-ray lumbar spine shows no fracture.  There is mild degenerative change lower lumbar spine and sacroiliac joints.  Patient is neurologically intact.  He does not have any red flags.    PLAN:  A shared decision making model was used.  The patient's values and choices were respected.  The following represents what was discussed and decided upon by the physician assistant and the patient.      1.  DIAGNOSTIC TESTS:    -I reviewed the x-ray lumbar spine.    -No additional diagnostic tests were ordered.  If pain were to flare back up, I would recommend obtaining an MRI lumbar spine.    2.  PHYSICAL THERAPY:  Discussed the importance of core strengthening, ROM, stretching exercises with the patient and how each of these entities is important in decreasing pain.  Explained to the patient that the purpose of physical therapy is to teach the patient a home exercise program.  These exercises need to be performed every day in order to decrease pain and prevent future occurrences of pain.  I entered a referral to physical therapy.  He will do the MedX program.    3.  MEDICATIONS: No changes are made to the patient's medications.  He is not currently taking any pain relief medications since pain has resolved.  -Patient completed a Medrol Dosepak which was helpful.  -Patient could take  nabumetone if needed.  -Patient could take cyclobenzaprine if needed.    4.  INTERVENTIONS:  No interventions were ordered today.  Flare pain has resolved.    5.  PATIENT EDUCATION: Patient is in agreement the above plan.  All questions were answered.  -We discussed trying to avoid heavy lifting, using good body mechanics, etc. in order to prevent future flares.    6.  FOLLOW-UP:   Patient is going to send a ELVPHD message if he has another flare of pain or if he does not feel he is making progress with physical therapy.  If he has any other questions or concerns, he should not hesitate to call.      SUBJECTIVE:  Titi Cabrera  Is a 42 year old male who presents today in consultation at the request of Dr. Tsang for new patient evaluation of low back pain.  Patient reports that he has had episodic low back pain for the past 3 to 4 years.  He states he gets 1-2 flareups per year.  Most recent flare began December 20 after walking on the treadmill.  Pain was so severe that he had difficulty getting out of bed on his own.  When he did get out of bed he felt faint.  He ended up going to a sports medicine clinic.  They prescribed a Medrol Dosepak which was helpful.  They also prescribed Flexeril and nabumetone which she took as needed and were also helpful.  He states at this point the pain has completely resolved.    Patient reports that he currently does not have any pain.  When his pain was present it spanned across the entire low back bilaterally.  Both sides are equally affected.  He states the muscles felt very tight.  He did not have any pain down the legs.  He did not have any numbness, tingling, weakness down the legs.  When pain was severe he felt he could not engage his core and he had to lean on things in order to stand upright.  Laying down is the most comfortable position but any movement made his pain worse.  He did not have any loss of bowel or bladder control.  Denies recent fevers.    Patient has  never had any treatments for this.  He has not had physical therapy.  He does not go to a chiropractor.  He has never had any spine surgeries or spine injections.  He does not take any pain relieving medications.    Current Outpatient Medications   Medication     cyclobenzaprine (FLEXERIL) 10 MG tablet     methylPREDNISolone (MEDROL DOSEPAK) 4 MG tablet therapy pack     MULTIVITAMIN ORAL     nabumetone (RELAFEN) 500 MG tablet         Allergies   Allergen Reactions     Other Environmental Allergy Unknown         Patient Active Problem List   Diagnosis     FH: colon cancer in first degree relative <60 years old     FH: BRCA1 gene positive       Past Surgical History:   Procedure Laterality Date     HERNIA REPAIR       REMOVAL OF SPERM DUCT(S)      Description: Surgery Of Male Genitalia Vasectomy;  Recorded: 03/27/2013;  Comments: December, 2012--[facility]       Family History   Problem Relation Age of Onset     Skin Cancer Mother      Breast Cancer Mother      Rectal Cancer Mother        Social history: Patient is .  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  He denies tobacco, alcohol, illicit drug use.      ROS: Positive for joint pain, muscle pain.  Specifically negative for bowel/bladder dysfunction, fevers,chills, appetite changes, unexplained weight loss.   Otherwise 13 systems reviewed are negative.  Please see the patient's intake questionnaire from today for details.      OBJECTIVE:  PHYSICAL EXAMINATION:    CONSTITUTIONAL:  Vital signs as above.  No acute distress.  The patient is well nourished and well groomed.  PSYCHIATRIC:  The patient is awake, alert, oriented to person, place, time and answering questions appropriately with clear speech.    HEENT: Normocephalic, atraumatic.  Sclera clear.  Neck is supple.  SKIN:  Skin over the face, bilateral lower extremities, and posterior torso is clean, dry, intact without rashes.    GAIT:  Gait is non-antalgic.  The patient is  able to heel and toe walk without significant difficulty.    STANDING EXAMINATION: No tenderness palpation bilateral lumbar paraspinous muscles.  Lumbar range of motion is full with flexion, extension, lateral rotation bilaterally.  MUSCLE STRENGTH:  The patient has 5/5 strength for the bilateral hip flexors, knee flexors/extensors, ankle dorsiflexors/plantar flexors, great toe extensors, ankle evertors/invertors.  NEUROLOGICAL: 2+ patellar, and achilles reflexes bilaterally.  Negative Babinski's bilaterally.  No ankle clonus bilaterally. Sensation to light touch is intact in the bilateral L4, L5, and S1 dermatomes.  VASCULAR:  2/4 dorsalis pedis pulses bilaterally.  Bilateral lower extremities are warm.  There is no pitting edema of the bilateral lower extremities.  ABDOMINAL:  Soft, non-distended, non-tender throughout all quadrants.  No pulsatile mass palpated in the left lower quadrant.  LYMPH NODES:  No palpable or tender inguinal lymph nodes.  MUSCULOSKELETAL: Straight leg raise is negative bilaterally.    RESULTS: I reviewed the x-ray lumbar spine from [place] dated December 22, 2022.  This shows no fracture.  There is mild lower lumbar spine degenerative change.  There is mild bilateral SI joint change, right more than left.        Again, thank you for allowing me to participate in the care of your patient.        Sincerely,        Judith Ricardo PA-C

## 2023-01-31 NOTE — PATIENT INSTRUCTIONS
An order for physicaltherapy has been provided today.  Someone will call you to schedule physical therapy or you can call [phone] to schedule physical therapy.  It will be very important for you to do your physical therapy exercises on aregular basis to decrease your pain and prevent future flares of pain.

## 2023-02-01 ENCOUNTER — HOSPITAL ENCOUNTER (OUTPATIENT)
Dept: PHYSICAL THERAPY | Facility: CLINIC | Age: 43
Discharge: HOME | End: 2023-02-01
Attending: PHYSICIAN ASSISTANT
Payer: COMMERCIAL

## 2023-02-01 DIAGNOSIS — M54.50 LUMBAR SPINE PAIN: ICD-10-CM

## 2023-02-01 PROCEDURE — 97110 THERAPEUTIC EXERCISES: CPT | Mod: GP

## 2023-02-01 PROCEDURE — 97161 PT EVAL LOW COMPLEX 20 MIN: CPT | Mod: GP

## 2023-02-01 NOTE — PROGRESS NOTES
Subjective: Pt has episodic BLP without radicular sx for past 3-4 years. Pt reports he gets flareups 1-2x/year. Most recent flare began ~12/20/22 after walking on a treadmill, this flareup lasted about 1.5-2 weeks. Pt reports this was the worst flareup he has ever had, he couldn't get out of bed and when he did he almost fainted. Pt reports he went to a sports medicine physician who prescribed medications which helped resolved his pain. Pt stated he did an incline walk on the treadmill the day before this flare up and was wondering if that was a contributing factor. Pt was on vacation shortly after this flare up and did walk uphill slightly on a beach that caused some discomfort in the back. Pt denies n/t and changes to bowel/bladder. When patient had pain, it was across his low back bilaterally, demonstrates from low back up to inferior angle of scapula. At the time, pt states pain was better with laying down and worse with any movement. Pt unable to stand upright when it was severe, needed to lean on surfaces. Pt has never had PT before. Pt is an active individual, likes to use his treadmill or elliptical a couple times of week, owns dumbbells and does some body weight exercises, martial arts a few times a week. Pt apprehensive about what movements he shouldn't do and about getting back into exercising without causing pain. When pain was severe, rated 9-10/10 at its worst. Currently pt has 0/10 pain. 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    Assessment: Pt presents for skilled PT services for episodic low back pain. Pt demonstrates no pain on evaluation and WNL ROM. Pt reports apprehension about returning to his workouts without pain and about mobility to avoid causing pain in the future. Pt demonstrates lumbar strength above 1 SD for age matched norms. Pt presents with appropriate questions regarding how to return to exercising. Pt  may not require full length of medex program, however would benefit from HEP. Pt understands this and will continue to address needs with treating therapists. Pt is appropriate to continue skilled PT services to address return to normal activities.    0% on HUMZA (did not fill out lifting as pt has not tried since injury due to fear of retriggering pain)    Lumbar MedX Initial testing    AROM (full=  0-72  lumbar) 0-72   Max Extension Torque  388   Flex: ext ratio (ideal 1.4:1) 1.57:1     Date 2/1/2023   Lumbar Parameters    Top Dead Center (TDC) 24   Counterbalance (CB) 300   Seat Pad 0   Femur Restraint 5   Week/Visit    Enter Week/Visit # Wk 1 V1   Weight (lbs) 232   Reps (#) 10   Time 70   ROM (degrees) 0-72   Pain Fatigue    Flex:Ext ratio      Date 2/1/2023   Exercise    treadmill x3 minutes   Rotary torso 60# to L   Theraband standing rotation Bx10 with blue band   Bird dog Legs only x10 - cues for core activation and decreased arching    Arms & legs x10   superman x10 - good form   Supine abdominal arm & leg extension (deadbug) x10 - cues for continued TrA activation   deadlifts with theraband 2 blue therabands, x10                         Savi Pedro, PT, DPT

## 2023-02-03 ENCOUNTER — HOSPITAL ENCOUNTER (OUTPATIENT)
Dept: PHYSICAL THERAPY | Facility: CLINIC | Age: 43
Discharge: HOME | End: 2023-02-03
Payer: COMMERCIAL

## 2023-02-03 DIAGNOSIS — M54.50 LUMBAR SPINE PAIN: Primary | ICD-10-CM

## 2023-02-03 PROCEDURE — 97110 THERAPEUTIC EXERCISES: CPT | Mod: GP | Performed by: PHYSICAL THERAPIST

## 2023-02-03 NOTE — PROGRESS NOTES
"I attest that I was present in the room, making skilled assessments and directing the service provided today.  I was responsible for the assessment and treatment of the patient.  During this time, I was not engaged in treating another patient or doing other tasks.    Navin Covarrubias, PT    Subjective:      \"Pt presents to physical therapy feeling well without pain. Mainly concerned and apprehensive about getting back into an active lifestyle due to recent low back flare-up he had in late December. Pt wants to be educated on certain movements he should avoid or do to ensure that he's exercising/moving in a safe manner.     Assessment:  Pt presents to physical therapy for first medX follow-up visit. In today's session pt was educated on maintaining a posteriorly pelvic tilt with stretches and exercises to promote better body mechanics. Reviewed HEP - specifically stiff-legged deadlifts with theraband; pt demonstrated safety and competency with exercise. Also discussed with pt about whether he would like to continue with medX program for the full 12 weeks or not. Explained that there are certainly benefits to completing full program but also based on his active lifetstyle and current level of pain, it would also be appropriate to discontinue program as long as he feels safe to do so.     Lumbar MedX Initial testing    AROM (full=  0-72  lumbar) 0-72   Max Extension Torque  388   Flex: ext ratio (ideal 1.4:1) 1.57:1     Date 2/3/2023 2/1/2023   Lumbar Parameters     Top Dead Center (TDC) 24 24   Counterbalance (CB) 300 300   Seat Pad 0 0   Femur Restraint 5 5   Week/Visit     Enter Week/Visit # Wk1 V2 Wk 1 V1   Weight (lbs) 232 232   Reps (#) 10 10   Time 64 70   ROM (degrees) 0-72 0-72   Pain  Fatigue    Flex:Ext ratio       Date 2/3/2023 2/1/2023   Exercise     treadmill X 5 mins x3 minutes   Rotary torso 60# to R 60# to L   Theraband standing rotation X10 each side w/lvl 4 band Bx10 with blue band   Bird dog  " Legs only x10 - cues for core activation and decreased arching    Arms & legs x10   superman  x10 - good form   Supine abdominal arm & leg extension (deadbug)  x10 - cues for continued TrA activation   deadlifts with theraband reviewed 2 blue therabands, x10                              Navin Covarrubias, PT

## 2023-04-24 NOTE — ADDENDUM NOTE
Encounter addended by: Navin Covarrubias, PT on: 4/24/2023 11:39 AM   Actions taken: Episode resolved, Clinical Note Signed

## 2023-04-24 NOTE — PROGRESS NOTES
[facility]    Outpatient Physical Therapy Discharge Note  Patient: Titi Cabrera  : 1980    Beginning/End Dates of Reporting Period:  23 to 2/3/23    Referring Provider: Judith Ricardo PA-C    Therapy Diagnosis: LBP     Client Self Report: see eval    Objective Measurements:  Objective Measure: HUMZA  Details: 0% on HUMZA (did not fill out lifting as pt has not tried since injury due to fear of retriggering pain)      Goals: Goals Met  Goal Identifier 1   Goal Description Pt will demonstrate independent sx and HEP management in 12 weeks   Target Date 23   Date Met      Progress (detail required for progress note):       Goal Identifier 2   Goal Description Pt will be able to return to his regular exercises and activities such as martial arts with 0/10 pain in 12 weeks   Target Date 23   Date Met      Progress (detail required for progress note):       Plan:  Discharge from therapy.    Discharge:    Reason for Discharge: Patient has met all goals.    Equipment Issued: NA    Discharge Plan: Patient to continue home program.    Navin Covarrubias, PT

## 2023-11-03 ASSESSMENT — ENCOUNTER SYMPTOMS
COUGH: 0
HEMATOCHEZIA: 0
FEVER: 0
DIARRHEA: 0
CHILLS: 0
DIZZINESS: 0
SORE THROAT: 0
PALPITATIONS: 0
NAUSEA: 0
NERVOUS/ANXIOUS: 0
WEAKNESS: 0
PARESTHESIAS: 0
DYSURIA: 0
FREQUENCY: 0
SHORTNESS OF BREATH: 0
HEMATURIA: 0
MYALGIAS: 0
EYE PAIN: 0
HEARTBURN: 0
CONSTIPATION: 0
JOINT SWELLING: 0
ARTHRALGIAS: 0
ABDOMINAL PAIN: 0
HEADACHES: 0

## 2023-11-10 ENCOUNTER — OFFICE VISIT (OUTPATIENT)
Dept: FAMILY MEDICINE | Facility: CLINIC | Age: 43
End: 2023-11-10
Payer: COMMERCIAL

## 2023-11-10 VITALS
RESPIRATION RATE: 16 BRPM | HEIGHT: 71 IN | HEART RATE: 68 BPM | DIASTOLIC BLOOD PRESSURE: 87 MMHG | OXYGEN SATURATION: 99 % | TEMPERATURE: 97.7 F | WEIGHT: 190.4 LBS | SYSTOLIC BLOOD PRESSURE: 127 MMHG | BODY MASS INDEX: 26.65 KG/M2

## 2023-11-10 DIAGNOSIS — Z00.00 HEALTHCARE MAINTENANCE: ICD-10-CM

## 2023-11-10 DIAGNOSIS — R04.0 EPISTAXIS: ICD-10-CM

## 2023-11-10 DIAGNOSIS — Z12.5 SCREENING PSA (PROSTATE SPECIFIC ANTIGEN): ICD-10-CM

## 2023-11-10 LAB
ALBUMIN SERPL BCG-MCNC: 4.3 G/DL (ref 3.5–5.2)
ALP SERPL-CCNC: 48 U/L (ref 40–129)
ALT SERPL W P-5'-P-CCNC: 24 U/L (ref 0–70)
ANION GAP SERPL CALCULATED.3IONS-SCNC: 11 MMOL/L (ref 7–15)
AST SERPL W P-5'-P-CCNC: 30 U/L (ref 0–45)
BILIRUB SERPL-MCNC: 1 MG/DL
BUN SERPL-MCNC: 15.3 MG/DL (ref 6–20)
CALCIUM SERPL-MCNC: 9.3 MG/DL (ref 8.6–10)
CHLORIDE SERPL-SCNC: 104 MMOL/L (ref 98–107)
CHOLEST SERPL-MCNC: 225 MG/DL
CREAT SERPL-MCNC: 0.94 MG/DL (ref 0.67–1.17)
DEPRECATED HCO3 PLAS-SCNC: 23 MMOL/L (ref 22–29)
EGFRCR SERPLBLD CKD-EPI 2021: >90 ML/MIN/1.73M2
ERYTHROCYTE [DISTWIDTH] IN BLOOD BY AUTOMATED COUNT: 12.1 % (ref 10–15)
GLUCOSE SERPL-MCNC: 91 MG/DL (ref 70–99)
HCT VFR BLD AUTO: 42.5 % (ref 40–53)
HDLC SERPL-MCNC: 44 MG/DL
HGB BLD-MCNC: 14.9 G/DL (ref 13.3–17.7)
LDLC SERPL CALC-MCNC: 156 MG/DL
MCH RBC QN AUTO: 31.6 PG (ref 26.5–33)
MCHC RBC AUTO-ENTMCNC: 35.1 G/DL (ref 31.5–36.5)
MCV RBC AUTO: 90 FL (ref 78–100)
NONHDLC SERPL-MCNC: 181 MG/DL
PLATELET # BLD AUTO: 188 10E3/UL (ref 150–450)
POTASSIUM SERPL-SCNC: 4 MMOL/L (ref 3.4–5.3)
PROT SERPL-MCNC: 6.9 G/DL (ref 6.4–8.3)
PSA SERPL DL<=0.01 NG/ML-MCNC: 0.5 NG/ML (ref 0–2.5)
RBC # BLD AUTO: 4.71 10E6/UL (ref 4.4–5.9)
SODIUM SERPL-SCNC: 138 MMOL/L (ref 135–145)
TRIGL SERPL-MCNC: 123 MG/DL
WBC # BLD AUTO: 5.1 10E3/UL (ref 4–11)

## 2023-11-10 PROCEDURE — G0103 PSA SCREENING: HCPCS | Performed by: FAMILY MEDICINE

## 2023-11-10 PROCEDURE — 36415 COLL VENOUS BLD VENIPUNCTURE: CPT | Performed by: FAMILY MEDICINE

## 2023-11-10 PROCEDURE — 80053 COMPREHEN METABOLIC PANEL: CPT | Performed by: FAMILY MEDICINE

## 2023-11-10 PROCEDURE — 85027 COMPLETE CBC AUTOMATED: CPT | Performed by: FAMILY MEDICINE

## 2023-11-10 PROCEDURE — 80061 LIPID PANEL: CPT | Performed by: FAMILY MEDICINE

## 2023-11-10 PROCEDURE — 99213 OFFICE O/P EST LOW 20 MIN: CPT | Mod: 25 | Performed by: FAMILY MEDICINE

## 2023-11-10 PROCEDURE — 99396 PREV VISIT EST AGE 40-64: CPT | Performed by: FAMILY MEDICINE

## 2023-11-10 ASSESSMENT — ENCOUNTER SYMPTOMS
NAUSEA: 0
SHORTNESS OF BREATH: 0
HEARTBURN: 0
WEAKNESS: 0
COUGH: 0
EYE PAIN: 0
DYSURIA: 0
JOINT SWELLING: 0
HEMATURIA: 0
DIARRHEA: 0
SORE THROAT: 0
FREQUENCY: 0
CONSTIPATION: 0
PARESTHESIAS: 0
NERVOUS/ANXIOUS: 0
CHILLS: 0
FEVER: 0
PALPITATIONS: 0
ARTHRALGIAS: 0
HEADACHES: 0
HEMATOCHEZIA: 0
ABDOMINAL PAIN: 0
DIZZINESS: 0
MYALGIAS: 0

## 2023-11-10 NOTE — LETTER
November 12, 2023      Titi Cabrera  [address]  [facility] [address]        Dear ,  We are writing to inform you of your test results.    Javier Walls:  Your PSA is normal. Make sure to get this yearly now.  The cholesterol and LDL are very mildly up.  Work on a diet low on saturated fats.  The remaining labs are normal.    Resulted Orders   CBC with platelets   Result Value Ref Range    WBC Count 5.1 4.0 - 11.0 10e3/uL    RBC Count 4.71 4.40 - 5.90 10e6/uL    Hemoglobin 14.9 13.3 - 17.7 g/dL    Hematocrit 42.5 40.0 - 53.0 %    MCV 90 78 - 100 fL    MCH 31.6 26.5 - 33.0 pg    MCHC 35.1 31.5 - 36.5 g/dL    RDW 12.1 10.0 - 15.0 %    Platelet Count 188 150 - 450 10e3/uL   Comprehensive metabolic panel (BMP + Alb, Alk Phos, ALT, AST, Total. Bili, TP)   Result Value Ref Range    Sodium 138 135 - 145 mmol/L      Comment:      Reference intervals for this test were updated on 09/26/2023 to more accurately reflect our healthy population. There may be differences in the flagging of prior results with similar values performed with this method. Interpretation of those prior results can be made in the context of the updated reference intervals.     Potassium 4.0 3.4 - 5.3 mmol/L    Carbon Dioxide (CO2) 23 22 - 29 mmol/L    Anion Gap 11 7 - 15 mmol/L    Urea Nitrogen 15.3 6.0 - 20.0 mg/dL    Creatinine 0.94 0.67 - 1.17 mg/dL    GFR Estimate >90 >60 mL/min/1.73m2    Calcium 9.3 8.6 - 10.0 mg/dL    Chloride 104 98 - 107 mmol/L    Glucose 91 70 - 99 mg/dL    Alkaline Phosphatase 48 40 - 129 U/L    AST 30 0 - 45 U/L      Comment:      Reference intervals for this test were updated on 6/12/2023 to more accurately reflect our healthy population. There may be differences in the flagging of prior results with similar values performed with this method. Interpretation of those prior results can be made in the context of the updated reference intervals.    ALT 24 0 - 70 U/L      Comment:      Reference intervals for  this test were updated on 6/12/2023 to more accurately reflect our healthy population. There may be differences in the flagging of prior results with similar values performed with this method. Interpretation of those prior results can be made in the context of the updated reference intervals.      Protein Total 6.9 6.4 - 8.3 g/dL    Albumin 4.3 3.5 - 5.2 g/dL    Bilirubin Total 1.0 <=1.2 mg/dL   Lipid panel reflex to direct LDL Fasting   Result Value Ref Range    Cholesterol 225 (H) <200 mg/dL    Triglycerides 123 <150 mg/dL    Direct Measure HDL 44 >=40 mg/dL    LDL Cholesterol Calculated 156 (H) <=100 mg/dL    Non HDL Cholesterol 181 (H) <130 mg/dL    Narrative    Cholesterol  Desirable:  <200 mg/dL    Triglycerides  Normal:  Less than 150 mg/dL  Borderline High:  150-199 mg/dL  High:  200-499 mg/dL  Very High:  Greater than or equal to 500 mg/dL    Direct Measure HDL  Female:  Greater than or equal to 50 mg/dL   Male:  Greater than or equal to 40 mg/dL    LDL Cholesterol  Desirable:  <100mg/dL  Above Desirable:  100-129 mg/dL   Borderline High:  130-159 mg/dL   High:  160-189 mg/dL   Very High:  >= 190 mg/dL    Non HDL Cholesterol  Desirable:  130 mg/dL  Above Desirable:  130-159 mg/dL  Borderline High:  160-189 mg/dL  High:  190-219 mg/dL  Very High:  Greater than or equal to 220 mg/dL   PSA, screen   Result Value Ref Range    Prostate Specific Antigen Screen 0.50 0.00 - 2.50 ng/mL    Narrative    This result is obtained using the Roche Elecsys total PSA method on the marylu [id number] immunoassay analyzer. Results obtained with different assay methods or kits cannot be used interchangeably.       If you have any questions or concerns, please call the clinic at the number listed above.       Sincerely,      Ross Macias MD

## 2023-11-10 NOTE — PROGRESS NOTES
"SUBJECTIVE:   CC: Titi is an 43 year old who presents for preventative health visit.       Healthy Habits:     Getting at least 3 servings of Calcium per day:  Yes    Bi-annual eye exam:  Yes    Dental care twice a year:  Yes    Sleep apnea or symptoms of sleep apnea:  None    Diet:  Regular (no restrictions)    Frequency of exercise:  6-7 days/week    Duration of exercise:  30-45 minutes    Taking medications regularly:  Yes    Medication side effects:  Not applicable    Additional concerns today:  No        Social History     Tobacco Use    Smoking status: Never     Passive exposure: Past (Parents smoked growing up)    Smokeless tobacco: Never   Substance Use Topics    Alcohol use: Yes     Comment: Alcoholic Drinks/day: Occasional          Alcohol: < 1 drink per week      11/3/2023     4:11 PM   Alcohol Use   Prescreen: >3 drinks/day or >7 drinks/week? No       Last PSA: No results found for: \"PSA\"    Reviewed orders with patient. Reviewed health maintenance and updated orders accordingly - Yes      Reviewed and updated as needed this visit by clinical staff   Tobacco  Allergies  Meds              Reviewed and updated as needed this visit by Provider                     Review of Systems   Constitutional:  Negative for chills and fever.   HENT:  Negative for congestion, ear pain, hearing loss and sore throat.    Eyes:  Negative for pain and visual disturbance.   Respiratory:  Negative for cough and shortness of breath.    Cardiovascular:  Negative for chest pain, palpitations and peripheral edema.   Gastrointestinal:  Negative for abdominal pain, constipation, diarrhea, heartburn, hematochezia and nausea.   Genitourinary:  Negative for dysuria, frequency, genital sores, hematuria, impotence, penile discharge and urgency.   Musculoskeletal:  Negative for arthralgias, joint swelling and myalgias.   Skin:  Negative for rash.   Neurological:  Negative for dizziness, weakness, headaches and paresthesias. " "  Psychiatric/Behavioral:  Negative for mood changes. The patient is not nervous/anxious.      Gets bloody noses more often and year round.    OBJECTIVE:   /87 (BP Location: Left arm, Patient Position: Sitting, Cuff Size: Adult Large)   Pulse 68   Temp 97.7  F (36.5  C) (Oral)   Resp 16   Ht 1.803 m (5' 11\")   Wt 86.4 kg (190 lb 6.4 oz)   SpO2 99%   BMI 26.56 kg/m      Physical Exam  GENERAL: healthy, alert and no distress  EYES: Eyes grossly normal to inspection, PERRL and conjunctivae and sclerae normal  HENT: ear canals and TM's normal, nose and mouth without ulcers or lesions  NECK: no adenopathy, no asymmetry, masses, or scars and thyroid normal to palpation  RESP: lungs clear to auscultation - no rales, rhonchi or wheezes  CV: regular rate and rhythm, normal S1 S2, no S3 or S4, no murmur, click or rub, no peripheral edema and peripheral pulses strong  ABDOMEN: soft, nontender, no hepatosplenomegaly, no masses and bowel sounds normal  RECTAL: KAR declined.   Will do a yearly PSA  MS: no gross musculoskeletal defects noted, no edema  SKIN: no suspicious lesions or rashes  NEURO: Normal strength and tone, mentation intact and speech normal  PSYCH: mentation appears normal, affect normal/bright        ASSESSMENT/PLAN:       ICD-10-CM    1. Healthcare maintenance  Z00.00 REVIEW OF HEALTH MAINTENANCE PROTOCOL ORDERS     CBC with platelets     Comprehensive metabolic panel (BMP + Alb, Alk Phos, ALT, AST, Total. Bili, TP)     Lipid panel reflex to direct LDL Fasting      2. Screening PSA (prostate specific antigen)  Z12.5 PSA, screen      3. Epistaxis, frequent R04.0 Adult ENT  Referral        PLAN:   Colonoscopy coming up in Dec 2023    Start annual PSA    Fasting labs    Referral to ENT to evaluate frequent nosebleeds    Patient has been advised of split billing requirements and indicates understanding: Yes      COUNSELING:   Reviewed preventive health counseling, as reflected in patient " "instructions       Regular exercise       Healthy diet/nutrition       Prostate cancer screening      BMI:   Estimated body mass index is 26.56 kg/m  as calculated from the following:    Height as of this encounter: 1.803 m (5' 11\").    Weight as of this encounter: 86.4 kg (190 lb 6.4 oz).   Weight management plan: Discussed healthy diet and exercise guidelines      He reports that he has never smoked. He has been exposed to tobacco smoke. He has never used smokeless tobacco.        Ross Macias MD  [facility]  "

## 2023-12-08 ENCOUNTER — TRANSFERRED RECORDS (OUTPATIENT)
Dept: HEALTH INFORMATION MANAGEMENT | Facility: CLINIC | Age: 43
End: 2023-12-08
Payer: COMMERCIAL

## 2024-02-29 ENCOUNTER — TELEPHONE (OUTPATIENT)
Dept: FAMILY MEDICINE | Facility: CLINIC | Age: 44
End: 2024-02-29
Payer: COMMERCIAL

## 2024-02-29 DIAGNOSIS — U07.1 INFECTION DUE TO 2019 NOVEL CORONAVIRUS: ICD-10-CM

## 2024-02-29 DIAGNOSIS — U07.1 INFECTION DUE TO 2019 NOVEL CORONAVIRUS: Primary | ICD-10-CM

## 2024-02-29 NOTE — TELEPHONE ENCOUNTER
RN COVID TREATMENT VISIT  02/29/24      The patient has been triaged and does not require a higher level of care.    Titi Cabrera  43 year old  Current weight? 180    Has the patient been seen by a primary care provider at an [facility] within the past two years? Yes.   Have you been in close proximity to/do you have a known exposure to a person with a confirmed case of influenza? No.     General treatment eligibility:  Date of positive COVID test (PCR or at home)?  2/29/24 home    Are you or have you been hospitalized for this COVID-19 infection? No.   Have you received monoclonal antibodies or antiviral treatment for COVID-19 since this positive test? No.   Do you have any of the following conditions that place you at risk of being very sick from COVID-19?   - Heart conditions such as cardiomyopathies, congenital heart defects, coronary artery disease, heart arrhythmias, heart failure, hypertension, valve disorders   Yes, patient has at least one high risk condition as noted above.     Current COVID symptoms:   - fever or chills  - muscle or body aches  - headache  - congestion or runny nose  Yes. Patient has at least one symptom as selected.     How many days since symptoms started? 5 days or less. Established patient, 12 years or older weighing at least 88.2 lbs, who has symptoms that started in the past 5 days, has not been hospitalized nor received treatment already, and is at risk for being very sick from COVID-19.     Treatment eligibility by RN:  Are you currently pregnant or nursing? No  Do you have a clinically significant hypersensitivity to nirmatrelvir or ritonavir, or toxic epidermal necrolysis (TEN) or Pfeiffer-Wei Syndrome? No  Do you have a history of hepatitis, any hepatic impairment on the Problem List (such as Child-Nice Class C, cirrhosis, fatty liver disease, alcoholic liver disease), or was the last liver lab (hepatic panel, ALT, AST, ALK Phos,  bilirubin) elevated in the past 6 months? No  Do you have any history of severe renal impairment (eGFR < 30mL/min)? No    Is patient eligible to continue? Yes, patient meets all eligibility requirements for the RN COVID treatment (as denoted by all no responses above).     Current Outpatient Medications   Medication Sig Dispense Refill    MULTIVITAMIN ORAL [MULTIVITAMIN ORAL] Take by mouth.         Medications from List 1 of the standing order (on medications that exclude the use of Paxlovid) that patient is taking: NONE. Is patient taking Box Elder's Wort? No  Is patient taking Box Elder's Wort or any meds from List 1? No.   Medications from List 2 of the standing order (on meds that provider needs to adjust) that patient is taking: NONE. Is patient on any of the meds from List 2? No.   Medications from List 3 of standing order (on meds that a RN needs to adjust) that patient is taking: NONE. Is patient on any meds from List 3? No.     Paxlovid has an approximate 90% reduction in hospitalization. Paxlovid can possibly cause altered sense of taste, diarrhea (loose, watery stools), high blood pressure, muscle aches.     Would patient like a Paxlovid prescription?   Yes.   Lab Results   Component Value Date    GFRESTIMATED >90 11/10/2023       Was last eGFR reduced? No, eGFR 60 or greater/ No Result on record. Patient can receive the normal renal function dose. Paxlovid Rx sent to [place] [facility]       Temporary change to home medications: None    All medication adjustments (holds, etc) were discussed with the patient and patient was asked to repeat back (teachback) their med adjustment.  Did patient understand med adjustment? No medication adjustments needed.         Reviewed the following instructions with the patient:    Paxlovid (nimatrelvir and ritonavir)    How it works  Two medicines (nirmatrelvir and ritonavir) are taken together. They stop the virus from growing. Less amount of virus is easier for your body  to fight.    How to take  Medicine comes in a daily container with both medicine tablets. Take by mouth twice daily (once in the morning, once at night) for 5 days.  The number of tablets to take varies by patient.  Don't chew or break capsules. Swallow whole.    When to take  Take as soon as possible after positive COVID-19 test result, and within 5 days of your first symptoms.    Possible side effects  Can cause altered sense of taste, diarrhea (loose, watery stools), high blood pressure, muscle aches.    Corinne Ravenwald, RN

## 2024-02-29 NOTE — TELEPHONE ENCOUNTER
Patient requesting paxlovid be sent to a new pharmacy that is attached.   Please re-send to other pharmacy because of issues with [facility].

## 2025-01-05 ENCOUNTER — HEALTH MAINTENANCE LETTER (OUTPATIENT)
Age: 45
End: 2025-01-05

## 2025-02-13 SDOH — HEALTH STABILITY: PHYSICAL HEALTH: ON AVERAGE, HOW MANY MINUTES DO YOU ENGAGE IN EXERCISE AT THIS LEVEL?: 40 MIN

## 2025-02-13 SDOH — HEALTH STABILITY: PHYSICAL HEALTH: ON AVERAGE, HOW MANY DAYS PER WEEK DO YOU ENGAGE IN MODERATE TO STRENUOUS EXERCISE (LIKE A BRISK WALK)?: 6 DAYS

## 2025-02-13 ASSESSMENT — SOCIAL DETERMINANTS OF HEALTH (SDOH): HOW OFTEN DO YOU GET TOGETHER WITH FRIENDS OR RELATIVES?: ONCE A WEEK

## 2025-03-30 SDOH — HEALTH STABILITY: PHYSICAL HEALTH: ON AVERAGE, HOW MANY DAYS PER WEEK DO YOU ENGAGE IN MODERATE TO STRENUOUS EXERCISE (LIKE A BRISK WALK)?: 6 DAYS

## 2025-03-30 SDOH — HEALTH STABILITY: PHYSICAL HEALTH: ON AVERAGE, HOW MANY MINUTES DO YOU ENGAGE IN EXERCISE AT THIS LEVEL?: 40 MIN

## 2025-03-30 ASSESSMENT — SOCIAL DETERMINANTS OF HEALTH (SDOH): HOW OFTEN DO YOU GET TOGETHER WITH FRIENDS OR RELATIVES?: ONCE A WEEK

## 2025-03-31 ENCOUNTER — OFFICE VISIT (OUTPATIENT)
Dept: FAMILY MEDICINE | Facility: CLINIC | Age: 45
End: 2025-03-31
Payer: COMMERCIAL

## 2025-03-31 VITALS
OXYGEN SATURATION: 99 % | HEIGHT: 71 IN | TEMPERATURE: 98.2 F | BODY MASS INDEX: 26.57 KG/M2 | HEART RATE: 64 BPM | RESPIRATION RATE: 16 BRPM | DIASTOLIC BLOOD PRESSURE: 86 MMHG | WEIGHT: 189.8 LBS | SYSTOLIC BLOOD PRESSURE: 130 MMHG

## 2025-03-31 DIAGNOSIS — Z12.5 SCREENING FOR PROSTATE CANCER: Primary | ICD-10-CM

## 2025-03-31 DIAGNOSIS — Z00.00 HEALTH CARE MAINTENANCE: ICD-10-CM

## 2025-03-31 LAB
ALBUMIN SERPL BCG-MCNC: 4.7 G/DL (ref 3.5–5.2)
ALP SERPL-CCNC: 53 U/L (ref 40–150)
ALT SERPL W P-5'-P-CCNC: 23 U/L (ref 0–70)
ANION GAP SERPL CALCULATED.3IONS-SCNC: 12 MMOL/L (ref 7–15)
AST SERPL W P-5'-P-CCNC: 25 U/L (ref 0–45)
BILIRUB SERPL-MCNC: 1.5 MG/DL
BUN SERPL-MCNC: 17.1 MG/DL (ref 6–20)
CALCIUM SERPL-MCNC: 9.6 MG/DL (ref 8.8–10.4)
CHLORIDE SERPL-SCNC: 103 MMOL/L (ref 98–107)
CHOLEST SERPL-MCNC: 239 MG/DL
CREAT SERPL-MCNC: 1.08 MG/DL (ref 0.67–1.17)
EGFRCR SERPLBLD CKD-EPI 2021: 85 ML/MIN/1.73M2
ERYTHROCYTE [DISTWIDTH] IN BLOOD BY AUTOMATED COUNT: 11.8 % (ref 10–15)
FASTING STATUS PATIENT QL REPORTED: YES
FASTING STATUS PATIENT QL REPORTED: YES
GLUCOSE SERPL-MCNC: 93 MG/DL (ref 70–99)
HCO3 SERPL-SCNC: 25 MMOL/L (ref 22–29)
HCT VFR BLD AUTO: 44.5 % (ref 40–53)
HDLC SERPL-MCNC: 40 MG/DL
HGB BLD-MCNC: 15.6 G/DL (ref 13.3–17.7)
LDLC SERPL CALC-MCNC: 166 MG/DL
MCH RBC QN AUTO: 31.1 PG (ref 26.5–33)
MCHC RBC AUTO-ENTMCNC: 35.1 G/DL (ref 31.5–36.5)
MCV RBC AUTO: 89 FL (ref 78–100)
NONHDLC SERPL-MCNC: 199 MG/DL
PLATELET # BLD AUTO: 200 10E3/UL (ref 150–450)
POTASSIUM SERPL-SCNC: 4.3 MMOL/L (ref 3.4–5.3)
PROT SERPL-MCNC: 7.5 G/DL (ref 6.4–8.3)
PSA SERPL DL<=0.01 NG/ML-MCNC: 0.57 NG/ML (ref 0–2.5)
RBC # BLD AUTO: 5.02 10E6/UL (ref 4.4–5.9)
SODIUM SERPL-SCNC: 140 MMOL/L (ref 135–145)
TRIGL SERPL-MCNC: 166 MG/DL
WBC # BLD AUTO: 5.1 10E3/UL (ref 4–11)

## 2025-03-31 PROCEDURE — G0103 PSA SCREENING: HCPCS | Performed by: FAMILY MEDICINE

## 2025-03-31 PROCEDURE — 80053 COMPREHEN METABOLIC PANEL: CPT | Performed by: FAMILY MEDICINE

## 2025-03-31 PROCEDURE — 3079F DIAST BP 80-89 MM HG: CPT | Performed by: FAMILY MEDICINE

## 2025-03-31 PROCEDURE — 80061 LIPID PANEL: CPT | Performed by: FAMILY MEDICINE

## 2025-03-31 PROCEDURE — 3075F SYST BP GE 130 - 139MM HG: CPT | Performed by: FAMILY MEDICINE

## 2025-03-31 PROCEDURE — 36415 COLL VENOUS BLD VENIPUNCTURE: CPT | Performed by: FAMILY MEDICINE

## 2025-03-31 PROCEDURE — 99396 PREV VISIT EST AGE 40-64: CPT | Performed by: FAMILY MEDICINE

## 2025-03-31 PROCEDURE — 85027 COMPLETE CBC AUTOMATED: CPT | Performed by: FAMILY MEDICINE

## 2025-03-31 NOTE — PROGRESS NOTES
"    Preventive Care Visit  [facility]  Daniel Yang MD, Family Medicine  Mar 31, 2025      Assessment & Plan     Health care maintenance  Patient here for annual exam no acute concerns  Interested in fasting blood work  - Comprehensive metabolic panel (BMP + Alb, Alk Phos, ALT, AST, Total. Bili, TP)  - CBC with platelets  - Lipid Profile (Chol, Trig, HDL, LDL calc)  - PSA, screen  - Comprehensive metabolic panel (BMP + Alb, Alk Phos, ALT, AST, Total. Bili, TP)  - CBC with platelets  - Lipid Profile (Chol, Trig, HDL, LDL calc)  - PSA, screen    Screening for prostate cancer  Patient like to continue screening for prostate cancer  - PSA, screen  - PSA, screen      Patient has been advised of split billing requirements and indicates understanding: Yes        BMI  Estimated body mass index is 26.47 kg/m  as calculated from the following:    Height as of this encounter: 1.803 m (5' 11\").    Weight as of this encounter: 86.1 kg (189 lb 12.8 oz).   Weight management plan: Discussed healthy diet and exercise guidelines    Counseling  Appropriate preventive services were addressed with this patient via screening, questionnaire, or discussion as appropriate for fall prevention, nutrition, physical activity, Tobacco-use cessation, social engagement, weight loss and cognition.  Checklist reviewing preventive services available has been given to the patient.  Reviewed patient's diet, addressing concerns and/or questions.           France Walls is a 45 year old, presenting for the following:  Physical        3/31/2025     8:35 AM   Additional Questions   Roomed by Karolina CHEW LPN          HPI  Patient here for annual exam and to establish care.  Spent time reviewing records with patient today.  Patient's blood pressure upper limits of normal continue with healthy eating and regular exercise.  Patient works as a professor at the [employer].  He is  and has 1 child.  Patient " is on no medications.  Patient follows with dermatology.  Up-to-date on immunizations and colon cancer screening.        Advance Care Planning  Patient does not have a Health Care Directive: Discussed advance care planning with patient; however, patient declined at this time.      3/30/2025   General Health   How would you rate your overall physical health? Excellent   Feel stress (tense, anxious, or unable to sleep) Only a little   (!) STRESS CONCERN      3/30/2025   Nutrition   Three or more servings of calcium each day? Yes   Diet: Regular (no restrictions)   How many servings of fruit and vegetables per day? 4 or more   How many sweetened beverages each day? 0-1         3/30/2025   Exercise   Days per week of moderate/strenous exercise 6 days   Average minutes spent exercising at this level 40 min         3/30/2025   Social Factors   Frequency of gathering with friends or relatives Once a week   Worry food won't last until get money to buy more No   Food not last or not have enough money for food? No   Do you have housing? (Housing is defined as stable permanent housing and does not include staying ouside in a car, in a tent, in an abandoned building, in an overnight shelter, or couch-surfing.) Yes   Are you worried about losing your housing? No   Lack of transportation? No   Unable to get utilities (heat,electricity)? No         3/30/2025   Dental   Dentist two times every year? Yes           Today's PHQ-2 Score:       3/30/2025    12:09 PM   PHQ-2 ( 1999 Pf[employer])   Q1: Little interest or pleasure in doing things 0   Q2: Feeling down, depressed or hopeless 0   PHQ-2 Score 0    Q1: Little interest or pleasure in doing things Not at all   Q2: Feeling down, depressed or hopeless Not at all   PHQ-2 Score 0       Patient-reported           3/30/2025   Substance Use   Alcohol more than 3/day or more than 7/wk No   Do you use any other substances recreationally? No     Social History     Tobacco Use    Smoking status:  "Never     Passive exposure: Past (Parents smoked growing up)    Smokeless tobacco: Never   Substance Use Topics    Alcohol use: Yes     Comment: Alcoholic Drinks/day: Occasional     Drug use: No           3/30/2025   STI Screening   New sexual partner(s) since last STI/HIV test? No   ASCVD Risk   The 10-year ASCVD risk score (Alonso WOLFE, et al., 2019) is: 3.6%    Values used to calculate the score:      Age: 45 years      Sex: Male      Is Non- : No      Diabetic: No      Tobacco smoker: No      Systolic Blood Pressure: 130 mmHg      Is BP treated: No      HDL Cholesterol: 44 mg/dL      Total Cholesterol: 225 mg/dL       Reviewed and updated as needed this visit by Provider                             Objective    Exam  /86   Pulse 64   Temp 98.2  F (36.8  C) (Oral)   Resp 16   Ht 1.803 m (5' 11\")   Wt 86.1 kg (189 lb 12.8 oz)   SpO2 99%   BMI 26.47 kg/m     Estimated body mass index is 26.47 kg/m  as calculated from the following:    Height as of this encounter: 1.803 m (5' 11\").    Weight as of this encounter: 86.1 kg (189 lb 12.8 oz).    Physical Exam  GENERAL: alert and no distress  EYES: Eyes grossly normal to inspection, PERRL and conjunctivae and sclerae normal  HENT: ear canals and TM's normal, nose and mouth without ulcers or lesions  RESP: lungs clear to auscultation - no rales, rhonchi or wheezes  CV: regular rate and rhythm, normal S1 S2, no S3 or S4, no murmur, click or rub, no peripheral edema  ABDOMEN: bowel sounds normal  MS: no gross musculoskeletal defects noted, no edema  NEURO: Normal strength and tone, mentation intact and speech normal  PSYCH: mentation appears normal, affect normal/bright      The longitudinal plan of care for the diagnosis(es)/condition(s) as documented were addressed during this visit. Due to the added complexity in care, I will continue to support Titi in the subsequent management and with ongoing continuity of care.  Signed " Electronically by: Daniel Yang MD

## 2025-04-03 DIAGNOSIS — E78.2 MIXED HYPERLIPIDEMIA: Primary | ICD-10-CM

## 2025-04-03 RX ORDER — ATORVASTATIN CALCIUM 20 MG/1
20 TABLET, FILM COATED ORAL DAILY
Qty: 90 TABLET | Refills: 3 | Status: SHIPPED | OUTPATIENT
Start: 2025-04-03